# Patient Record
Sex: FEMALE | Race: WHITE | Employment: PART TIME | URBAN - METROPOLITAN AREA
[De-identification: names, ages, dates, MRNs, and addresses within clinical notes are randomized per-mention and may not be internally consistent; named-entity substitution may affect disease eponyms.]

---

## 2017-04-26 ENCOUNTER — HOSPITAL ENCOUNTER (OUTPATIENT)
Dept: MAMMOGRAPHY | Age: 47
Discharge: HOME | End: 2017-04-26
Attending: FAMILY MEDICINE
Payer: COMMERCIAL

## 2017-04-26 DIAGNOSIS — Z00.00 ROUTINE GENERAL MEDICAL EXAMINATION AT A HEALTH CARE FACILITY: ICD-10-CM

## 2017-04-26 PROCEDURE — 77067 SCR MAMMO BI INCL CAD: CPT

## 2018-05-08 PROBLEM — E66.01 SEVERE OBESITY (BMI 35.0-39.9) WITH COMORBIDITY (HCC): Status: ACTIVE | Noted: 2018-05-08

## 2018-06-19 ENCOUNTER — HOSPITAL ENCOUNTER (OUTPATIENT)
Dept: MAMMOGRAPHY | Age: 48
Discharge: HOME | End: 2018-06-19
Attending: OBSTETRICS & GYNECOLOGY
Payer: COMMERCIAL

## 2018-06-19 DIAGNOSIS — Z12.39 SCREENING BREAST EXAMINATION: ICD-10-CM

## 2018-06-19 PROCEDURE — 77067 SCR MAMMO BI INCL CAD: CPT

## 2019-05-06 ENCOUNTER — HOSPITAL ENCOUNTER (OUTPATIENT)
Dept: PHYSICAL THERAPY | Age: 49
Discharge: HOME | End: 2019-05-06
Attending: FAMILY MEDICINE
Payer: COMMERCIAL

## 2019-05-06 NOTE — PROGRESS NOTES
Carla Esquivel  : 1970  Primary: [facility]  Secondary:  [address]  at [facility]  [address] , [address], [place] [address]  [phone]   [phone]        OUTPATIENT DAILY NOTE    NAME/AGE/GENDER: Carla Esquivel is a 57 y.o. female. DATE: 2019    Patient cancelled her appointment today. Will plan to follow up on next scheduled visit.     Clearance Mateus, PT, DPT

## 2019-05-14 ENCOUNTER — HOSPITAL ENCOUNTER (OUTPATIENT)
Dept: PHYSICAL THERAPY | Age: 49
Discharge: HOME | End: 2019-05-14
Attending: FAMILY MEDICINE
Payer: COMMERCIAL

## 2019-05-14 DIAGNOSIS — M54.2 NECK PAIN: ICD-10-CM

## 2019-05-14 DIAGNOSIS — M62.838 NECK MUSCLE SPASM: ICD-10-CM

## 2019-05-14 PROCEDURE — 97012 MECHANICAL TRACTION THERAPY: CPT | Performed by: PHYSICAL THERAPIST

## 2019-05-14 PROCEDURE — 97161 PT EVAL LOW COMPLEX 20 MIN: CPT | Performed by: PHYSICAL THERAPIST

## 2019-05-14 PROCEDURE — 97110 THERAPEUTIC EXERCISES: CPT | Performed by: PHYSICAL THERAPIST

## 2019-05-14 NOTE — PROGRESS NOTES
Norma Mooney  : 1970  Primary: [facility]  Secondary:  [address] at [address] , [address], [place] [address]  [phone]   [phone]      ICD-10: Treatment Diagnosis: (M54.2) Cervicalgia; (M62.81) muscle weakness  Precautions/Allergies:   Buckwheat; Cottonseed oil; and Tree nuts ; Latex  MD Orders: Evaluate and Treat for neck pain MEDICAL/REFERRING DIAGNOSIS:  Neck pain [M54.2]  Neck muscle spasm [M62.838]   DATE OF ONSET:   REFERRING PHYSICIAN: Ángela Magana MD  RETURN PHYSICIAN APPOINTMENT:      OUTPATIENT PHYSICAL THERAPY: Daily Treatment Note 2019  Pre-treatment Symptoms/Complaints:  Pt c/o central and L side neck pain and tightness, as well as, intermittent radicular symptoms that has progressively worsened over the last 5 years. Pain: Initial: Pain Intensity 1: 8  Pain Location 1: Neck  Post Session:  7/10   Medications Last Reviewed:  2019    Updated Objective Findings:  See evaluation note from today   TREATMENT:   THERAPEUTIC EXERCISE: (25 minutes):  Exercises per grid below to improve mobility and strength. Required moderate visual, verbal and tactile cues to promote proper body alignment, promote proper body posture, promote proper body mechanics and promote proper body breathing techniques. Progressed resistance, range, repetitions and complexity of movement as indicated. MANUAL THERAPY: (0 minutes): Joint mobilization and Soft tissue mobilization was utilized and necessary because of the patient's restricted joint motion, painful spasm, loss of articular motion and restricted motion of soft tissue. MODALITIES: (0 minutes): *  Ultrasound was used today secondary to the patient having tightened structures limiting joint motion that require an increase in extensibility.  Ultrasound was used today to reduce pain, reduce spasm, reduce joint stiffness, increase muscle flexibility, increase tendon flexibility and increase ligament flexibility. MECHANICAL TRACTION: (15 minutes): Traction was used due to the patient's cervical radiculopathy and cervicalgia in order to relieve pain in or originating from his spine. Intermittent Mechanical Traction (IMT) @ 20deg angle; (15#/10#); (60 sec hold/20 sec rest); 50% speed, 2 steps to improve cervical AROM and pain, as well as, decrease radicular symptoms. Date:  5/14/19 Date:   Date:     Activity/Exercise Parameters Parameters Parameters   Cervical retraction 10 x 3sec     Posterior shoulder rolls 10x     Shoulder shrugs w/ depression 10x     Scapular retraction 10 x 3sec     Snow [place] at wall 10 x 3sec     Cervical rotation 10 x B                 Treatment/Session Summary:    · Response to Treatment:  Pt with minimally decreased L UT tightness and improved L cervical AROM rotation after traction. .  · Communication/Consultation:  Pt encouraged to work on correct, erect posture, avoiding forward head and rounded shoulders. · Equipment provided today:  HEP  · Recommendations/Intent for next treatment session: Next visit will focus on advancements to more challenging activities. Assess effectiveness of intermittent mechanical traction.   · Variance to POC: None  Treatment Plan of Care Effective Dates:  5/14/19 to 8/12/19  Total Treatment Billable Duration:  40 minutes (25 min therex; 15 min traction); see initial assessment  PT Patient Time In/Time Out  Time In: 0935  Time Out: 100 [facility], PT    Future Appointments   Date Time Provider Alexandra Sims   5/24/2019  8:45 AM Ethridge Sep, PT [facility]   5/29/2019  8:45 AM Bennett Sep, PT [facility]   6/7/2019  8:45 AM Ethridge Sep, PT [facility]   6/12/2019 10:30 AM Bennett Sep, PT [facility]   6/20/2019  4:00 PM [facility] ROOM 1 ERMBARBARA VAL   6/26/2019 10:30 AM Bennett Sep, PT [facility]   7/3/2019 10:30 AM Ruchi Antonia Lott, PT [facility]

## 2019-05-14 NOTE — THERAPY EVALUATION
Nathaniel Al  : 1970  Primary: [facility]  Secondary:  [address]  at [facility]  [address] , [address], [place] [address]  [phone]   [phone]       OUTPATIENT PHYSICAL THERAPY:Initial Assessment 2019   ICD-10: Treatment Diagnosis: (M54.2) Cervicalgia; (M62.81) muscle weakness  Precautions/Allergies:   Buckwheat; Cottonseed oil; and Tree nuts ; Latex  MD Orders: Evaluate and Treat for neck pain MEDICAL/REFERRING DIAGNOSIS:  Neck pain [M54.2]  Neck muscle spasm [M62.838]   DATE OF ONSET:   REFERRING PHYSICIAN: Nanda Watson MD  RETURN PHYSICIAN APPOINTMENT:      INITIAL ASSESSMENT:  Ms. Michael Erazo presents with s/s consistent with cervicalgia due to poor posture. She c/o pain with palpation centrally at C5-C6, as well as, C5-C6 transverse processes B, L > R. Her radicular symptoms follow a C5-C6 distribution down the L UE. She scored a 20% disability rating on the NDI. She states she's still able to work and perform fairly normal job duties. Pt will benefit from skilled PT to address the deficits listed below. PROBLEM LIST (Impacting functional limitations):  1. Decreased Strength  2. Increased Pain  3. Decreased Flexibility/Joint Mobility  4. Decreased Knowledge of Precautions  5. Decreased [place] with Home Exercise Program  6. Decreased Posture INTERVENTIONS PLANNED: (Treatment may consist of any combination of the following)  1. Cold  2. Electrical Stimulation  3. Heat  4. Home Exercise Program (HEP)  5. Manual Therapy  6. Neuromuscular Re-education/Strengthening  7. Range of Motion (ROM)  8. Therapeutic Exercise/Strengthening  9. Transcutaneous Electrical Nerve Stimulation (TENS)  10. Ultrasound (US)  11. Mechanical Traction   12. Dry Needling  13. Kinesiotaping   TREATMENT PLAN:  Effective Dates: 2019 TO 2019 (90 days).   Frequency/Duration: 1-2 times a week for 90 Day(s)  GOALS: (Goals have been discussed and agreed upon with patient.)  Short-Term Functional Goals: Time Frame: 6 weeks (6-25-19)  Pt will be compliant and independent with HEP and demonstrating correct, erect posture without an increase in pain. Pt will improve NDI score to <9/50 to increase pt's overall functional mobility. Pt will improve L shoulder flex to full range with manageable pain (< 5/10) from 90deg and greater increasing pt's ease with overhead activities. Pt to subjectively report a decrease in pain to 5/10 @ worst to increase pt's ease with working, driving and reading. Pt will improve Cervical AROM to > Flex and L rotation to 75% of normal motion with pain <5/10 @ worst.   Discharge Goals: Time Frame: 8-12 weeks (8-12-19)  Pt will improve NDI score to <7/50 to increase pt's overall functional mobility. Pt will improve B shoulder flex/scapt/ABD AROM to WNL B with minimal pain with OH activity. Pt will subjectively report a decrease in pain to 2-3/10 @ worst to allow pt greater ease with reaching overhead. Pt will subjectively report a decrease in frequency and severity of pain with normal work duties. Pt will be discharged from PT to [facility]. OUTCOME MEASURE:   Tool Used: Neck Disability Index (NDI)  Score:  Initial: 10/50 =20% disability Most Recent: X/50 (Date: -- )   Interpretation of Score: The Neck Disability Index is a revised form of the Oswestry Low Back Pain Index and is designed to measure the activities of daily living in person's with neck pain. Each section is scored on a 0-5 scale, 5 representing the greatest disability. The scores of each section are added together for a total score of 50. MEDICAL NECESSITY:   · Patient is expected to demonstrate progress in strength and range of motion to improve pt's posture while decreasing neck and L UE radicular symptoms to increase pt's overall functional mobility.  .  Total Duration: 15 minutes initial assessment; see daily treatment note  PT Patient Time In/Time Out  Time In: 09  Time Out: 1030    Rehabilitation Potential For Stated Goals: Good  Regarding Yane Spencer therapy, I certify that the treatment plan above will be carried out by a therapist or under their direction. Thank you for this referral,  Elsa Hernández, PT     Referring Physician Signature: Mariana Sanabria MD _______________________________ Date _____________     PAIN/SUBJECTIVE:   Initial: Pain Intensity 1: 8  Pain Location 1: Neck  Post Session:  7/10   HISTORY:   History of Injury/Illness (Reason for Referral):  Pt reports she's had neck pain off and on x 5 years with no known cause. She states she now has some radicular symptoms that run down her L UE to her elbow and around her scapular region. She states she takes Tylenol prn and uses lidocaine patches prn for pain management. She reports she would like to improve her pain with daily house hold and work activities. Past Medical History/Comorbidities:   Ms. Mira Pozo  has a past medical history of B12 deficiency (3/9/2015), Esophagitis determined by endoscopy (2016), Gastrointestinal disorder, GERD (gastroesophageal reflux disease) (2015), Heart palpitations, Hyperlipidemia LDL goal < 100 (3/9/2015), Hypertension, Palpitations (2015), Passive smoker (2015), Psychiatric disorder, and Vitamin D deficiency (3/9/2015). Ms. Mira Pozo  has a past surgical history that includes hx cholecystectomy; hx tubal ligation; and hx  section. Social History/Living Environment:     Pt states she lives in a 2 story home with her family with a tub/shower combo. 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 She states she has some difficulty performing overhead activities, but she's able to perform her normal job.    Dominant Side:         RIGHT   Ambulatory/Rehab Services H2 Model Falls Risk Assessment (19)   Risk Factors: No Risk Factors Identified Ability to Rise from Chair:       (0)  Ability to rise in a single movement   Falls Prevention Plan:       No modifications necessary   Total: (5 or greater = High Risk): 0   ©2010 [facility] of [facility] Patent #[id number]. Federal Law prohibits the replication, distribution or use without written permission from [facility] of [employer] [place]   Current Medications:       Current Outpatient Medications:     ergocalciferol (ERGOCALCIFEROL) 50,000 unit capsule, Take 1 Cap by mouth every seven (7) days. , Disp: 12 Cap, Rfl: 1    atorvastatin (LIPITOR) 10 mg tablet, Take 1 Tab by mouth daily. Indications: high cholesterol, treatment to prevent a heart attack, Disp: 90 Tab, Rfl: 3    ALPRAZolam (XANAX) 0.5 mg tablet, Take 1 Tab by mouth daily as needed for Anxiety. Indications: panic disorder, Disp: 90 Tab, Rfl: 1    buPROPion (WELLBUTRIN) 75 mg tablet, Take 1 Tab by mouth daily (with breakfast). Indications: Anxiety, Disp: 90 Tab, Rfl: 3    tiZANidine (ZANAFLEX) 2 mg tablet, Take 1 Tab by mouth nightly. Indications: muscle spasm, Disp: 90 Tab, Rfl: 0    fluticasone (FLONASE) 50 mcg/actuation nasal spray, 2 Sprays by Both Nostrils route daily. , Disp: 3 Bottle, Rfl: 5    montelukast (SINGULAIR) 10 mg tablet, Take 1 Tab by mouth every morning. For allergies, Disp: 90 Tab, Rfl: 3   Date Last Reviewed:  5/14/2019     Number of Personal Factors/Comorbidities that affect the Plan of Care: 0: LOW COMPLEXITY   EXAMINATION:   Observation/Orthostatic Postural Assessment:          Pt sits with moderately forward head and rounded shoulders. She is very large busted. Palpation:          Pt moderately to severely tender with palpation to central C spine, C5, C6 and C7, as well as, L side transverse processes. Increased B cervical spine and UT tightness, L > R. Trigger points: Pt with positive trigger points in L anterior and posterior sharp of L UT.    Flexibility: Tightness in B pec major/pec minor, as well as, anterior scalenes and B UT/levator scap, L > R. Posture/Deformity: none noted  Cervical AROM: % of normal motion in standing  Cervical spine Date:  5/14/19 Date:   Date:     Direction  Parameters Parameters Parameters   Flexion  50% w/ pain L     Extension  50% no pain     Rotation  R: 75% w/ tightness L  L:50% w/ pain L      Side bending  R: 50% w/ tightness B  L:50% w/ tightness B     Shoulder Flex R:WFL; no pain  L:WFL; pain > 90deg     Shoulder ABD R:WFL; no pain  L:WFL; no pain     Shoulder ER/IR R:WFL B, each  L:WFL B, each         Strength Date:  5/14/19 Date:   Date:     Cervical/Shoulder Parameters Parameters Parameters   Cervical 3+/5 throughout     Shoulder Flex/Scapt 3+/5 B     Shoulder ABD 3+/5 B     Shoulder ER 3+/5 B     Shoulder IR 3+/5 B     Elbow Flex 4/5 B     Elbow Ext 3/5 B       Myotomes: Normal and equal B throughout  Diaphragm (C4):  Deltoid/Biceps (C5):  Wrist Extensors (C6):  Triceps (C7):  Flexor Profundus (C8):    Sensation: Normal and equal B throughout  Biceps (C5):   Griffin Radial (C6-C7):  Griffin Ulner (C8-T1):    Special Test/Function:  Compression: + pain B C-spine  Distraction: no change  Spurling's maneuver:+ pain/radicular symptoms L  Accessory mobility: restricted throughout     Body Structures Involved:  1. Nerves  2. Bones  3. Joints  4. Muscles  5. Ligaments Body Functions Affected:  1. Sensory/Pain  2. Neuromusculoskeletal  3. Movement Related Activities and Participation Affected:  1. Mobility  2. Self Care  3. Domestic Life  4. Interpersonal Interactions and Relationships  5. Community, Social and [place]  6.  Work   Number of elements (examined above) that affect the Plan of Care: 1-2: LOW COMPLEXITY   CLINICAL PRESENTATION:   Presentation: Stable and uncomplicated: LOW COMPLEXITY   CLINICAL DECISION MAKING:   Use of outcome tool(s) and clinical judgement create a POC that gives a: Clear prediction of patient's progress: LOW COMPLEXITY

## 2019-05-24 ENCOUNTER — HOSPITAL ENCOUNTER (OUTPATIENT)
Dept: PHYSICAL THERAPY | Age: 49
Discharge: HOME | End: 2019-05-24
Attending: FAMILY MEDICINE
Payer: COMMERCIAL

## 2019-05-24 PROCEDURE — 97012 MECHANICAL TRACTION THERAPY: CPT | Performed by: PHYSICAL THERAPIST

## 2019-05-24 PROCEDURE — 97035 APP MDLTY 1+ULTRASOUND EA 15: CPT | Performed by: PHYSICAL THERAPIST

## 2019-05-24 PROCEDURE — 97140 MANUAL THERAPY 1/> REGIONS: CPT | Performed by: PHYSICAL THERAPIST

## 2019-05-24 NOTE — PROGRESS NOTES
Yordan Silvia  : 1970  Primary: [facility]  Secondary:  [address]  at [facility]  [address] , [address], [place] [address]  [phone]   [phone]      ICD-10: Treatment Diagnosis: (M54.2) Cervicalgia; (M62.81) muscle weakness  Precautions/Allergies:   Buckwheat; Cottonseed oil; and Tree nuts ; Latex  MD Orders: Evaluate and Treat for neck pain MEDICAL/REFERRING DIAGNOSIS:  Neck pain [M54.2]  Neck muscle spasm [M62.838]   DATE OF ONSET:   REFERRING PHYSICIAN: Lieutenant José Miguel MD  RETURN PHYSICIAN APPOINTMENT:      OUTPATIENT PHYSICAL THERAPY: Daily Treatment Note 2019  Pre-treatment Symptoms/Complaints:  Pt reports increased arm soreness after initial evaluation, however, reports improved neck pain. She reports compliance with HEP. Pain: Initial: Pain Intensity 1: 7  Pain Location 1: Neck  Post Session:  5/10; improved motion B   Medications Last Reviewed:  2019    Updated Objective Findings:  None Today   TREATMENT:   THERAPEUTIC EXERCISE: (0 minutes):  Exercises per grid below to improve mobility and strength. Required moderate visual, verbal and tactile cues to promote proper body alignment, promote proper body posture, promote proper body mechanics and promote proper body breathing techniques. Progressed resistance, range, repetitions and complexity of movement as indicated. MANUAL THERAPY: (15 minutes): Joint mobilization and Soft tissue mobilization was utilized and necessary because of the patient's restricted joint motion, painful spasm, loss of articular motion and restricted motion of soft tissue. MODALITIES: (10 minutes): *  Ultrasound was used today secondary to the patient having tightened structures limiting joint motion that require an increase in extensibility.  Ultrasound was used today to reduce pain, reduce spasm, reduce joint stiffness, increase muscle flexibility, increase tendon flexibility and increase ligament flexibility. MECHANICAL TRACTION: (15 minutes): Traction was used due to the patient's cervical radiculopathy and cervicalgia in order to relieve pain in or originating from his spine. Intermittent Mechanical Traction (IMT) @ 25deg angle; (15#/10#); (60 sec hold/20 sec rest); 50% speed, 2 steps to improve cervical AROM and pain, as well as, decrease radicular symptoms. Kinesiotape to inhibit B UT to aide with decreasing pain and improving tissue mobility. Date:  5/14/19 Date:   Date:     Activity/Exercise Parameters Parameters Parameters   Cervical retraction 10 x 3sec     Posterior shoulder rolls 10x     Shoulder shrugs w/ depression 10x     Scapular retraction 10 x 3sec     Snow [place] at wall 10 x 3sec     Cervical rotation 10 x B                 Treatment/Session Summary:    · Response to Treatment:  Pt with increased tightness and tissue density in B cervical paraspinals, B UT and B levator scap that responded well to US, STM/MFR and tape. She demonstrated improved cervical rotation AROM after IMT. Pt is progreassing nicely toward current goals. .  · Communication/Consultation:  Pt encouraged to work on correct, erect posture, avoiding forward head and rounded shoulders. Pt encouraged to drink water after manual therapy. · Equipment provided today:  Kinesiotape  · Recommendations/Intent for next treatment session: Next visit will focus on advancements to more challenging activities. Assess effectiveness of intermittent mechanical traction, US, manual therapy and kinesiotape.   · Variance to POC: None  Treatment Plan of Care Effective Dates:  5/14/19 to 8/12/19  Total Treatment Billable Duration:  40 minutes (15 min traction; 10 min US; 15 min manual)  PT Patient Time In/Time Out  Time In: 3390  Time Out: 0930  Peggy Gonzáles PT    Future Appointments   Date Time Provider Alexandra Sims   6/4/2019  1:45 PM Michael Ramirez PT [facility]   6/7/2019  8:45 AM [facility]   6/12/2019 10:30 AM Gopi Ling, PT [facility]   6/20/2019  4:00 PM [facility] ROOM 1 SHRUTHI BROTHERS   6/26/2019 10:30 AM [facility] Ling[facility]   7/3/2019 10:30 AM Carden, Janella Pallas, PT [facility]

## 2019-05-29 ENCOUNTER — APPOINTMENT (OUTPATIENT)
Dept: PHYSICAL THERAPY | Age: 49
End: 2019-05-29
Attending: FAMILY MEDICINE
Payer: COMMERCIAL

## 2019-06-04 ENCOUNTER — HOSPITAL ENCOUNTER (OUTPATIENT)
Dept: PHYSICAL THERAPY | Age: 49
Discharge: HOME | End: 2019-06-04
Attending: FAMILY MEDICINE
Payer: COMMERCIAL

## 2019-06-04 PROCEDURE — 97012 MECHANICAL TRACTION THERAPY: CPT | Performed by: PHYSICAL THERAPIST

## 2019-06-04 PROCEDURE — 97035 APP MDLTY 1+ULTRASOUND EA 15: CPT | Performed by: PHYSICAL THERAPIST

## 2019-06-04 PROCEDURE — 97140 MANUAL THERAPY 1/> REGIONS: CPT | Performed by: PHYSICAL THERAPIST

## 2019-06-04 NOTE — PROGRESS NOTES
Fagan Nathan  : 1970  Primary: [facility]  Secondary:  [address]  at [facility]  [address] , [address], [place] [address]  [phone]   [phone]      ICD-10: Treatment Diagnosis: (M54.2) Cervicalgia; (M62.81) muscle weakness  Precautions/Allergies:   Buckwheat; Cottonseed oil; and Tree nuts ; Latex  MD Orders: Evaluate and Treat for neck pain MEDICAL/REFERRING DIAGNOSIS:  Neck pain [M54.2]  Neck muscle spasm [M62.838]   DATE OF ONSET:   REFERRING PHYSICIAN: Deuce Larios MD  RETURN PHYSICIAN APPOINTMENT:      OUTPATIENT PHYSICAL THERAPY: Daily Treatment Note 2019  Pre-treatment Symptoms/Complaints:  Pt reports improved neck and L arm pain after last PT visit. She states she did well with the tape and it lasted 3-4 days. Pain: Initial: Pain Intensity 1: 5  Pain Location 1: Neck  Post Session:  3/10; improved motion B   Medications Last Reviewed:  2019    Updated Objective Findings:  None Today   TREATMENT:   THERAPEUTIC EXERCISE: (0 minutes):  Exercises per grid below to improve mobility and strength. Required moderate visual, verbal and tactile cues to promote proper body alignment, promote proper body posture, promote proper body mechanics and promote proper body breathing techniques. Progressed resistance, range, repetitions and complexity of movement as indicated. MANUAL THERAPY: (15 minutes): Joint mobilization and Soft tissue mobilization was utilized and necessary because of the patient's restricted joint motion, painful spasm, loss of articular motion and restricted motion of soft tissue. MODALITIES: (10 minutes): *  Ultrasound was used today secondary to the patient having tightened structures limiting joint motion that require an increase in extensibility.  Ultrasound was used today to reduce pain, reduce spasm, reduce joint stiffness, increase muscle flexibility, increase tendon flexibility and increase ligament flexibility. MECHANICAL TRACTION: (15 minutes): Traction was used due to the patient's cervical radiculopathy and cervicalgia in order to relieve pain in or originating from his spine. Intermittent Mechanical Traction (IMT) @ 20deg angle; (19#/10#); (60 sec hold/20 sec rest); 50% speed, 2 steps to improve cervical AROM and pain, as well as, decrease radicular symptoms. Kinesiotape to inhibit B UT to aide with decreasing pain and improving tissue mobility. Date:  5/14/19 Date:   Date:     Activity/Exercise Parameters Parameters Parameters   Cervical retraction 10 x 3sec     Posterior shoulder rolls 10x     Shoulder shrugs w/ depression 10x     Scapular retraction 10 x 3sec     Snow [place] at wall 10 x 3sec     Cervical rotation 10 x B                 Treatment/Session Summary:    · Response to Treatment:  B UT tightness and tissue density continues. She was able to tolerate trigger point release in B UT. Her cervical flexion and rotation AROM improved after IMT. Pt is progressing toward current goals. .  · Communication/Consultation:  Pt encouraged to work on correct, erect posture, avoiding forward head and rounded shoulders. Pt encouraged to drink water after manual therapy. · Equipment provided today:  Kinesiotape  · Recommendations/Intent for next treatment session: Next visit will focus on manual therapy, modalities and traction as long as it is providing benefits. Advance postural stretches and strengthening as pt is able.   · Variance to POC: None  Treatment Plan of Care Effective Dates:  5/14/19 to 8/12/19  Total Treatment Billable Duration:  40 minutes (15 min traction; 10 min US; 15 min manual)  PT Patient Time In/Time Out  Time In: 1345  Time Out: 1435  Luke Meter, PT    Future Appointments   Date Time Provider Alexandra Sims   6/7/2019  8:45 AM JADE Farmer   6/12/2019 10:30 AM Veronica Childs PT [facility]   6/20/2019  4:00 PM DENEEN WILEY  ROOM 1 SHRUTHI [facility]   6/26/2019 10:30 AM JADE [facility]   7/3/2019 10:30 AM Amaya Hopper PT [facility]

## 2019-06-12 ENCOUNTER — HOSPITAL ENCOUNTER (OUTPATIENT)
Dept: PHYSICAL THERAPY | Age: 49
Discharge: HOME | End: 2019-06-12
Attending: FAMILY MEDICINE
Payer: COMMERCIAL

## 2019-06-12 PROCEDURE — 97012 MECHANICAL TRACTION THERAPY: CPT | Performed by: PHYSICAL THERAPIST

## 2019-06-12 PROCEDURE — 97035 APP MDLTY 1+ULTRASOUND EA 15: CPT | Performed by: PHYSICAL THERAPIST

## 2019-06-12 PROCEDURE — 97140 MANUAL THERAPY 1/> REGIONS: CPT | Performed by: PHYSICAL THERAPIST

## 2019-06-12 NOTE — PROGRESS NOTES
Haskel Brunner  : 1970  Primary: [facility]  Secondary:  [address]  at [facility]  [address] , [address], [place]  [phone]   [phone]      ICD-10: Treatment Diagnosis: (M54.2) Cervicalgia; (M62.81) muscle weakness  Precautions/Allergies:   Buckwheat; Cottonseed oil; and Tree nuts ; Latex  MD Orders: Evaluate and Treat for neck pain MEDICAL/REFERRING DIAGNOSIS:  Neck pain [M54.2]  Neck muscle spasm [M62.838]   DATE OF ONSET:   REFERRING PHYSICIAN: Kavitha Haro MD  RETURN PHYSICIAN APPOINTMENT:      OUTPATIENT PHYSICAL THERAPY: Daily Treatment Note 2019  Pre-treatment Symptoms/Complaints: Pt reports she's had more superior shoulder tension/tigthness over the last week. Continued improvement in radicular symptoms. Pain: Initial: Pain Intensity 1: 4  Pain Location 1: Neck  Post Session:  2-3/10; improved motion B   Medications Last Reviewed:  2019    Updated Objective Findings:  None Today   TREATMENT:   THERAPEUTIC EXERCISE: (0 minutes):  Exercises per grid below to improve mobility and strength. Required moderate visual, verbal and tactile cues to promote proper body alignment, promote proper body posture, promote proper body mechanics and promote proper body breathing techniques. Progressed resistance, range, repetitions and complexity of movement as indicated. MANUAL THERAPY: (15 minutes): Joint mobilization and Soft tissue mobilization was utilized and necessary because of the patient's restricted joint motion, painful spasm, loss of articular motion and restricted motion of soft tissue. MODALITIES: (10 minutes): *  Ultrasound @ 1 MHz, 100%, 1.0w/cm2  was used today secondary to the patient having tightened structures limiting joint motion that require an increase in extensibility.  Ultrasound was used today to reduce pain, reduce spasm, reduce joint stiffness, increase muscle flexibility, increase tendon flexibility and increase ligament flexibility. MECHANICAL TRACTION: (15 minutes): Traction was used due to the patient's cervical radiculopathy and cervicalgia in order to relieve pain in or originating from his spine. Intermittent Mechanical Traction (IMT) @ 20deg angle; (19#/10#); (60 sec hold/20 sec rest); 50% speed, 2 steps to improve cervical AROM and pain, as well as, decrease radicular symptoms. Kinesiotape to inhibit B UT to aide with decreasing pain and improving tissue mobility. Date:  5/14/19 Date:   Date:     Activity/Exercise Parameters Parameters Parameters   Cervical retraction 10 x 3sec     Posterior shoulder rolls 10x     Shoulder shrugs w/ depression 10x     Scapular retraction 10 x 3sec     Snow [place] at wall 10 x 3sec     Cervical rotation 10 x B                 Treatment/Session Summary:    · Response to Treatment:  B UT tightness and tissue density is improving. She did well with manual therapy today. Trigger point release not needed. Improved cervical flexion and B rotation AROM improved after intermittent mechanical traction. Pt is progressing toward current goals. .  · Communication/Consultation:  Pt encouraged to work on correct, erect posture, avoiding forward head and rounded shoulders. Pt encouraged to drink water after manual therapy. · Equipment provided today:  Kinesiotape  · Recommendations/Intent for next treatment session: Next visit will focus on manual therapy, modalities and traction as long as it is providing benefits. Advance postural stretches and strengthening as pt is able.   · Variance to POC: None  Treatment Plan of Care Effective Dates:  5/14/19 to 8/12/19  Total Treatment Billable Duration:  40 minutes (15 min traction; 10 min US; 15 min manual)  PT Patient Time In/Time Out  Time In: 1040  Time Out: [id number] [facility],     Future Appointments   Date Time Provider Alexandra Sims   6/20/2019  4:00 PM [facility] ROOM [facility] [place] 6/26/2019 10:30 AM Michael Ramirez, JADE W[facility]EN[facility]   7/3/2019 10:30 AM Amaya Castillo, JADE WADE [facility]ILLENNIUM

## 2019-06-20 ENCOUNTER — HOSPITAL ENCOUNTER (OUTPATIENT)
Dept: MAMMOGRAPHY | Age: 49
Discharge: HOME | End: 2019-06-20
Attending: FAMILY MEDICINE
Payer: COMMERCIAL

## 2019-06-20 DIAGNOSIS — Z12.39 BREAST CANCER SCREENING: ICD-10-CM

## 2019-06-20 PROCEDURE — 77067 SCR MAMMO BI INCL CAD: CPT

## 2019-06-21 NOTE — PROGRESS NOTES
Dear Christopher Hanna     Your recent mammogram showed :No mammographic evidence of malignancy.      Recommend annual mammogram in one year.  A reminder letter will be scheduled.     Thanks,  Dr. Cristino Gibson

## 2019-06-26 ENCOUNTER — HOSPITAL ENCOUNTER (OUTPATIENT)
Dept: PHYSICAL THERAPY | Age: 49
Discharge: HOME | End: 2019-06-26
Attending: FAMILY MEDICINE
Payer: COMMERCIAL

## 2019-06-26 PROCEDURE — 97035 APP MDLTY 1+ULTRASOUND EA 15: CPT | Performed by: PHYSICAL THERAPIST

## 2019-06-26 PROCEDURE — 97012 MECHANICAL TRACTION THERAPY: CPT | Performed by: PHYSICAL THERAPIST

## 2019-06-26 PROCEDURE — 97140 MANUAL THERAPY 1/> REGIONS: CPT | Performed by: PHYSICAL THERAPIST

## 2019-06-26 NOTE — PROGRESS NOTES
Hunter Jeffrey  : 1970  Primary: [facility]  Secondary:  [address]  at [facility]  [address] , [address], [place] [address]  [phone]   [phone]      ICD-10: Treatment Diagnosis: (M54.2) Cervicalgia; (M62.81) muscle weakness  Precautions/Allergies:   Buckwheat; Cottonseed oil; and Tree nuts ; Latex  MD Orders: Evaluate and Treat for neck pain MEDICAL/REFERRING DIAGNOSIS:  Neck pain [M54.2]  Neck muscle spasm [M62.838]   DATE OF ONSET:   REFERRING PHYSICIAN: Whit Heath MD  RETURN PHYSICIAN APPOINTMENT:      OUTPATIENT PHYSICAL THERAPY: Daily Treatment Note 2019  Pre-treatment Symptoms/Complaints: Pt reports increased cervical and L UE pain after last PT visit, but then her pan subsided and she's had only minimal over the last week. Pain: Initial: Pain Intensity 1: 3  Pain Location 1: Neck  Post Session:  2/10; improved motion B   Medications Last Reviewed:  2019    Updated Objective Findings:  None Today   TREATMENT:   THERAPEUTIC EXERCISE: (0 minutes):  Exercises per grid below to improve mobility and strength. Required moderate visual, verbal and tactile cues to promote proper body alignment, promote proper body posture, promote proper body mechanics and promote proper body breathing techniques. Progressed resistance, range, repetitions and complexity of movement as indicated. MANUAL THERAPY: (15 minutes): Joint mobilization and Soft tissue mobilization was utilized and necessary because of the patient's restricted joint motion, painful spasm, loss of articular motion and restricted motion of soft tissue. MODALITIES: (10 minutes): *  Ultrasound @ 1 MHz, 100%, 1.0w/cm2  was used today secondary to the patient having tightened structures limiting joint motion that require an increase in extensibility.  Ultrasound was used today to reduce pain, reduce spasm, reduce joint stiffness, increase muscle flexibility, increase tendon flexibility and increase ligament flexibility. MECHANICAL TRACTION: (15 minutes): Traction was used due to the patient's cervical radiculopathy and cervicalgia in order to relieve pain in or originating from his spine. Intermittent Mechanical Traction (IMT) @ 20deg angle; (15#/10#); (60 sec hold/20 sec rest); 50% speed, 2 steps to improve cervical AROM and pain, as well as, decrease radicular symptoms. Kinesiotape to inhibit B UT to aide with decreasing pain and improving tissue mobility. Date:  5/14/19 Date:   Date:     Activity/Exercise Parameters Parameters Parameters   Cervical retraction 10 x 3sec     Posterior shoulder rolls 10x     Shoulder shrugs w/ depression 10x     Scapular retraction 10 x 3sec     [place] at wall 10 x 3sec     Cervical rotation 10 x B                 Treatment/Session Summary:    · Response to Treatment:  B UT tightness and tissue density is improving. She did well with manual therapy today. Trigger point release not needed. Improved cervical flexion and B rotation AROM improved after intermittent mechanical traction. Pt is progressing toward current goals. Pt continues to respond well to above treatment. Pt is progressing toward current goals. · Communication/Consultation:  Pt encouraged to work on correct, erect posture, avoiding forward head and rounded shoulders. Pt encouraged to drink water after manual therapy. · Equipment provided today:  Kinesiotape  · Recommendations/Intent for next treatment session: Next visit will focus on manual therapy, modalities and traction as long as it is providing benefits. Advance postural stretches and strengthening as pt is able.   · Variance to POC: None  Treatment Plan of Care Effective Dates:  5/14/19 to 8/12/19  Total Treatment Billable Duration:  40 minutes (15 min traction; 10 min US; 15 min manual)  PT Patient Time In/Time Out  Time In: 1035  Time Out: 1735 Stefanie Sexton, PT    Future Appointments   Date Time Provider Alexandra Sims   7/3/2019 10:30 AM [facility]

## 2019-07-05 ENCOUNTER — HOSPITAL ENCOUNTER (OUTPATIENT)
Dept: PHYSICAL THERAPY | Age: 49
Discharge: HOME | End: 2019-07-05
Attending: FAMILY MEDICINE
Payer: COMMERCIAL

## 2019-07-05 PROCEDURE — 97035 APP MDLTY 1+ULTRASOUND EA 15: CPT | Performed by: PHYSICAL THERAPIST

## 2019-07-05 PROCEDURE — 97140 MANUAL THERAPY 1/> REGIONS: CPT | Performed by: PHYSICAL THERAPIST

## 2019-07-05 PROCEDURE — 97012 MECHANICAL TRACTION THERAPY: CPT | Performed by: PHYSICAL THERAPIST

## 2019-07-05 NOTE — PROGRESS NOTES
Eugenia   : 1970  Primary: [facility]  Secondary:  [address] at [facility]  [address] , [address], [place] [address]  [phone]   [phone]      ICD-10: Treatment Diagnosis: (M54.2) Cervicalgia; (M62.81) muscle weakness  Precautions/Allergies:   Buckwheat; Cottonseed oil; and Tree nuts ; Latex  MD Orders: Evaluate and Treat for neck pain MEDICAL/REFERRING DIAGNOSIS:  Neck pain [M54.2]  Neck muscle spasm [M62.838]   DATE OF ONSET:   REFERRING PHYSICIAN: Kaitlin Hernandez MD  RETURN PHYSICIAN APPOINTMENT:      OUTPATIENT PHYSICAL THERAPY: Daily Treatment Note 2019  Pre-treatment Symptoms/Complaints: Pt reports neck pain and tightness is improving and feels better for several days after her PT appointment. Pt reports it is difficult to get here more than 1x/week due to having children and working. Pain: Initial: Pain Intensity 1: 3  Pain Location 1: Neck  Post Session:  1-2/10; improved motion B   Medications Last Reviewed:  2019    Updated Objective Findings:  None Today   TREATMENT:   THERAPEUTIC EXERCISE: (0 minutes):  Exercises per grid below to improve mobility and strength. Required moderate visual, verbal and tactile cues to promote proper body alignment, promote proper body posture, promote proper body mechanics and promote proper body breathing techniques. Progressed resistance, range, repetitions and complexity of movement as indicated. MANUAL THERAPY: (15 minutes): Joint mobilization and Soft tissue mobilization was utilized and necessary because of the patient's restricted joint motion, painful spasm, loss of articular motion and restricted motion of soft tissue. MODALITIES: (10 minutes): *  Ultrasound @ 1 MHz, 100%, 1.0w/cm2  was used today secondary to the patient having tightened structures limiting joint motion that require an increase in extensibility.  Ultrasound was used today to reduce pain, reduce spasm, reduce joint stiffness, increase muscle flexibility, increase tendon flexibility and increase ligament flexibility. MECHANICAL TRACTION: (15 minutes): Traction was used due to the patient's cervical radiculopathy and cervicalgia in order to relieve pain in or originating from his spine. Intermittent Mechanical Traction (IMT) @ 20deg angle; (17#/10#); (60 sec hold/20 sec rest); 50% speed, 2 steps to improve cervical AROM and pain, as well as, decrease radicular symptoms. Kinesiotape to inhibit B UT to aide with decreasing pain and improving tissue mobility. Date:  5/14/19 Date:   Date:     Activity/Exercise Parameters Parameters Parameters   Cervical retraction 10 x 3sec     Posterior shoulder rolls 10x     Shoulder shrugs w/ depression 10x     Scapular retraction 10 x 3sec     [place] at wall 10 x 3sec     Cervical rotation 10 x B                 Treatment/Session Summary:    · Response to Treatment:  B UT tigthness continues, but is improving. She reports less frequency of L UE pain and radicular symptoms. She was able to tolerate increased poundage pull with traction today with no aderse effects. Pt continues to repond well to above treatment. Pt is slowly progressing toward current goals. · Communication/Consultation:  Pt encouraged to work on correct, erect posture, avoiding forward head and rounded shoulders. Pt encouraged to drink water after manual therapy. · Equipment provided today:  None today  · Recommendations/Intent for next treatment session: Next visit will focus on manual therapy, modalities and traction as long as it is providing benefits. Advance postural stretches and strengthening as pt is able. Re-assess over the next couple of weeks.    · Variance to POC: None  Treatment Plan of Care Effective Dates:  5/14/19 to 8/12/19  Total Treatment Billable Duration:  40 minutes (15 min traction; 10 min US; 15 min manual)  PT Patient Time In/Time Out  Time In: 5200  Time Out: [address] [place], [place]    No future appointments.

## 2019-08-05 NOTE — PROGRESS NOTES
I am accessing Ms. Marce Goldberg chart as a part of our department's internal chart auditing process. I certify that Ms. Marty Blackwell is, or was, a patient in our department.   Thank you,  Louise Woo, PT  8/5/2019

## 2019-09-25 NOTE — THERAPY DISCHARGE
Marni Oneill : 1970 Primary: [facility] Secondary:  Therapy Center at [facility] [address], [address], [place] [address] [phone]   [phone] OUTPATIENT PHYSICAL THERAPY:Discontinuation Summary 2019 ICD-10: Treatment Diagnosis: (M54.2) Cervicalgia; (M62.81) muscle weakness Precautions/Allergies:  
Buckwheat; Cottonseed oil; and Tree nuts ; Latex MD Orders: Evaluate and Treat for neck pain MEDICAL/REFERRING DIAGNOSIS: 
Neck pain [M54.2] DATE OF ONSET:  REFERRING PHYSICIAN: Grace Goldstein MD 
RETURN PHYSICIAN APPOINTMENT: [id number] Marni Oneill has been seen in physical therapy from 19 to 19 for 6 visits. Treatment has been discontinued at this time due to Expiration of plan of care without further referral by ordering physician and patient failing to return for additional treatment. The below goals were met prior to discontinuation. Some goals were not met due to chronicity of the problem, as well as, inabililty to assess progress. Pt is DC'd from PT to HEP at this time. Thank you for this referral.    
  
PROBLEM LIST (Impacting functional limitations): 1. Decreased Strength 2. Increased Pain 3. Decreased Flexibility/Joint Mobility 4. Decreased Knowledge of Precautions 5. Decreased [place] with Home Exercise Program 
6. Decreased Posture INTERVENTIONS PLANNED: (Treatment may consist of any combination of the following) 1. Cold 2. Electrical Stimulation 3. Heat 4. Home Exercise Program (HEP) 5. Manual Therapy 6. Neuromuscular Re-education/Strengthening 7. Range of Motion (ROM) 8. Therapeutic Exercise/Strengthening 9. Transcutaneous Electrical Nerve Stimulation (TENS) 10. Ultrasound (US) 11. Mechanical Traction 12. Dry Needling 13. Kinesiotaping TREATMENT PLAN: 
Effective Dates: 2019 TO 2019 (90 days).   Frequency/Duration: 1-2 times a week for 90 Day(s) GOALS: (Goals have been discussed and agreed upon with patient.) Short-Term Functional Goals: Time Frame: 6 weeks (6-25-19) Pt will be compliant and independent with HEP and demonstrating correct, erect posture without an increase in pain.------------------------------MET Pt will improve NDI score to <9/50 to increase pt's overall functional mobility.---------------------------------------------------------NOT ASSESSED Pt will improve L shoulder flex to full range with manageable pain (< 5/10) from 90deg and greater increasing pt's ease with overhead activities. --------------MET Pt to subjectively report a decrease in pain to 5/10 @ worst to increase pt's ease with working, driving and reading. ----------------------------------MET Pt will improve Cervical AROM to > Flex and L rotation to 75% of normal motion with pain <5/10 @ worst. ---------------------------------------------MET Discharge Goals: Time Frame: 8-12 weeks (8-12-19) Pt will improve NDI score to <7/50 to increase pt's overall functional mobility.------------------------------NOT ASSESSED Pt will improve B shoulder flex/scapt/ABD AROM to WNL B with minimal pain with OH activity. -----------------------------MET Pt will subjectively report a decrease in pain to 2-3/10 @ worst to allow pt greater ease with reaching overhead. ------------------MET Pt will subjectively report a decrease in frequency and severity of pain with normal work duties. ----------------------------------MET Pt will be discharged from PT to HEP. ---------------------------------------------------------------MET Guanaco Oliver PT

## 2020-06-28 ENCOUNTER — HOSPITAL ENCOUNTER (OUTPATIENT)
Dept: MAMMOGRAPHY | Age: 50
Discharge: HOME | End: 2020-06-28
Attending: OBSTETRICS & GYNECOLOGY
Payer: COMMERCIAL

## 2020-06-28 DIAGNOSIS — Z12.31 SCREENING MAMMOGRAM, ENCOUNTER FOR: ICD-10-CM

## 2020-06-28 PROCEDURE — 77067 SCR MAMMO BI INCL CAD: CPT

## 2021-05-25 PROBLEM — Z63.79 FAMILY ILLNESS: Status: ACTIVE | Noted: 2021-05-25

## 2021-05-25 PROBLEM — F41.8 ANXIETY WITH DEPRESSION: Status: ACTIVE | Noted: 2021-05-25

## 2021-05-25 PROBLEM — M72.2 PLANTAR FASCIA SYNDROME: Status: ACTIVE | Noted: 2021-05-25

## 2021-06-21 ENCOUNTER — TRANSCRIBE ORDER (OUTPATIENT)
Dept: SCHEDULING | Age: 51
End: 2021-06-21

## 2021-06-21 DIAGNOSIS — Z12.31 SCREENING MAMMOGRAM FOR HIGH-RISK PATIENT: Primary | ICD-10-CM

## 2021-07-01 ENCOUNTER — HOSPITAL ENCOUNTER (OUTPATIENT)
Dept: MAMMOGRAPHY | Age: 51
Discharge: HOME | End: 2021-07-01
Attending: OBSTETRICS & GYNECOLOGY
Payer: COMMERCIAL

## 2021-07-01 DIAGNOSIS — Z12.31 SCREENING MAMMOGRAM FOR HIGH-RISK PATIENT: ICD-10-CM

## 2021-07-01 PROCEDURE — 77067 SCR MAMMO BI INCL CAD: CPT

## 2022-03-18 PROBLEM — E66.01 SEVERE OBESITY (BMI 35.0-39.9) WITH COMORBIDITY (HCC): Status: ACTIVE | Noted: 2018-05-08

## 2022-03-18 PROBLEM — F41.8 ANXIETY WITH DEPRESSION: Status: ACTIVE | Noted: 2021-05-25

## 2022-03-19 PROBLEM — M72.2 PLANTAR FASCIA SYNDROME: Status: ACTIVE | Noted: 2021-05-25

## 2022-03-19 PROBLEM — Z63.79 FAMILY ILLNESS: Status: ACTIVE | Noted: 2021-05-25

## 2022-06-22 ENCOUNTER — HOSPITAL ENCOUNTER (OUTPATIENT)
Dept: MAMMOGRAPHY | Age: 52
Discharge: HOME | End: 2022-06-25
Payer: COMMERCIAL

## 2022-06-22 DIAGNOSIS — Z12.31 OTHER SCREENING MAMMOGRAM: ICD-10-CM

## 2022-06-22 PROCEDURE — 77067 SCR MAMMO BI INCL CAD: CPT

## 2023-05-11 ENCOUNTER — OFFICE VISIT (OUTPATIENT)
Dept: PRIMARY CARE CLINIC | Facility: CLINIC | Age: 53
End: 2023-05-11
Payer: COMMERCIAL

## 2023-05-11 VITALS
HEIGHT: 60 IN | HEART RATE: 62 BPM | RESPIRATION RATE: 15 BRPM | WEIGHT: 165 LBS | DIASTOLIC BLOOD PRESSURE: 89 MMHG | SYSTOLIC BLOOD PRESSURE: 138 MMHG | TEMPERATURE: 97.4 F | BODY MASS INDEX: 32.39 KG/M2 | OXYGEN SATURATION: 97 %

## 2023-05-11 DIAGNOSIS — F41.8 ANXIETY WITH DEPRESSION: ICD-10-CM

## 2023-05-11 DIAGNOSIS — R06.02 SHORTNESS OF BREATH: ICD-10-CM

## 2023-05-11 DIAGNOSIS — Z79.899 MEDICATION MANAGEMENT: ICD-10-CM

## 2023-05-11 DIAGNOSIS — E78.2 MIXED HYPERLIPIDEMIA: ICD-10-CM

## 2023-05-11 DIAGNOSIS — R73.9 ELEVATED BLOOD SUGAR: ICD-10-CM

## 2023-05-11 DIAGNOSIS — Z11.59 ENCOUNTER FOR HEPATITIS C SCREENING TEST FOR LOW RISK PATIENT: ICD-10-CM

## 2023-05-11 DIAGNOSIS — Z12.11 COLON CANCER SCREENING: ICD-10-CM

## 2023-05-11 DIAGNOSIS — Z11.4 ENCOUNTER FOR SCREENING FOR HIV: ICD-10-CM

## 2023-05-11 DIAGNOSIS — Z00.01 ENCOUNTER FOR GENERAL ADULT MEDICAL EXAMINATION WITH ABNORMAL FINDINGS: ICD-10-CM

## 2023-05-11 DIAGNOSIS — Z00.01 ENCOUNTER FOR GENERAL ADULT MEDICAL EXAMINATION WITH ABNORMAL FINDINGS: Primary | ICD-10-CM

## 2023-05-11 DIAGNOSIS — Z91.89 MULTIPLE RISK FACTORS FOR CORONARY ARTERY DISEASE: ICD-10-CM

## 2023-05-11 DIAGNOSIS — Z01.419 PERIODIC HEALTH ASSESSMENT, PAP AND PELVIC: ICD-10-CM

## 2023-05-11 PROBLEM — M72.2 PLANTAR FASCIA SYNDROME: Status: RESOLVED | Noted: 2021-05-25 | Resolved: 2023-05-11

## 2023-05-11 PROBLEM — Z63.79 FAMILY ILLNESS: Status: RESOLVED | Noted: 2021-05-25 | Resolved: 2023-05-11

## 2023-05-11 PROBLEM — E66.01 SEVERE OBESITY (BMI 35.0-39.9) WITH COMORBIDITY (HCC): Status: RESOLVED | Noted: 2018-05-08 | Resolved: 2023-05-11

## 2023-05-11 LAB
ALBUMIN SERPL-MCNC: 3.6 G/DL (ref 3.5–5)
ALBUMIN/GLOB SERPL: 1 (ref 0.4–1.6)
ALP SERPL-CCNC: 87 U/L (ref 50–136)
ALT SERPL-CCNC: 33 U/L (ref 12–65)
ANION GAP SERPL CALC-SCNC: 6 MMOL/L (ref 2–11)
AST SERPL-CCNC: 19 U/L (ref 15–37)
BASOPHILS # BLD: 0 K/UL (ref 0–0.2)
BASOPHILS NFR BLD: 1 % (ref 0–2)
BILIRUB SERPL-MCNC: 0.4 MG/DL (ref 0.2–1.1)
BILIRUBIN, URINE, POC: NEGATIVE
BLOOD URINE, POC: NORMAL
BUN SERPL-MCNC: 15 MG/DL (ref 6–23)
CALCIUM SERPL-MCNC: 8.9 MG/DL (ref 8.3–10.4)
CHLORIDE SERPL-SCNC: 109 MMOL/L (ref 101–110)
CHOLEST SERPL-MCNC: 192 MG/DL
CO2 SERPL-SCNC: 23 MMOL/L (ref 21–32)
CREAT SERPL-MCNC: 0.6 MG/DL (ref 0.6–1)
DIFFERENTIAL METHOD BLD: NORMAL
EOSINOPHIL # BLD: 0.1 K/UL (ref 0–0.8)
EOSINOPHIL NFR BLD: 1 % (ref 0.5–7.8)
ERYTHROCYTE [DISTWIDTH] IN BLOOD BY AUTOMATED COUNT: 14.5 % (ref 11.9–14.6)
GLOBULIN SER CALC-MCNC: 3.6 G/DL (ref 2.8–4.5)
GLUCOSE SERPL-MCNC: 97 MG/DL (ref 65–100)
GLUCOSE URINE, POC: NEGATIVE
HCT VFR BLD AUTO: 43.2 % (ref 35.8–46.3)
HDLC SERPL-MCNC: 61 MG/DL (ref 40–60)
HDLC SERPL: 3.1
HGB BLD-MCNC: 13.8 G/DL (ref 11.7–15.4)
IMM GRANULOCYTES # BLD AUTO: 0 K/UL (ref 0–0.5)
IMM GRANULOCYTES NFR BLD AUTO: 0 % (ref 0–5)
KETONES, URINE, POC: NEGATIVE
LDLC SERPL CALC-MCNC: 115.4 MG/DL
LEUKOCYTE ESTERASE, URINE, POC: NORMAL
LYMPHOCYTES # BLD: 1.5 K/UL (ref 0.5–4.6)
LYMPHOCYTES NFR BLD: 33 % (ref 13–44)
MCH RBC QN AUTO: 26.6 PG (ref 26.1–32.9)
MCHC RBC AUTO-ENTMCNC: 31.9 G/DL (ref 31.4–35)
MCV RBC AUTO: 83.4 FL (ref 82–102)
MONOCYTES # BLD: 0.4 K/UL (ref 0.1–1.3)
MONOCYTES NFR BLD: 8 % (ref 4–12)
NEUTS SEG # BLD: 2.7 K/UL (ref 1.7–8.2)
NEUTS SEG NFR BLD: 57 % (ref 43–78)
NITRITE, URINE, POC: NEGATIVE
NRBC # BLD: 0 K/UL (ref 0–0.2)
PH, URINE, POC: 6 (ref 4.6–8)
PLATELET # BLD AUTO: 239 K/UL (ref 150–450)
PMV BLD AUTO: 11.7 FL (ref 9.4–12.3)
POTASSIUM SERPL-SCNC: 4 MMOL/L (ref 3.5–5.1)
PROT SERPL-MCNC: 7.2 G/DL (ref 6.3–8.2)
PROTEIN,URINE, POC: NEGATIVE
RBC # BLD AUTO: 5.18 M/UL (ref 4.05–5.2)
SODIUM SERPL-SCNC: 138 MMOL/L (ref 133–143)
SPECIFIC GRAVITY, URINE, POC: 1.01 (ref 1–1.03)
TRIGL SERPL-MCNC: 78 MG/DL (ref 35–150)
TSH, 3RD GENERATION: 1.4 UIU/ML (ref 0.36–3.74)
URINALYSIS CLARITY, POC: CLEAR
URINALYSIS COLOR, POC: YELLOW
UROBILINOGEN, POC: NORMAL
VLDLC SERPL CALC-MCNC: 15.6 MG/DL (ref 6–23)
WBC # BLD AUTO: 4.7 K/UL (ref 4.3–11.1)

## 2023-05-11 PROCEDURE — 81003 URINALYSIS AUTO W/O SCOPE: CPT | Performed by: FAMILY MEDICINE

## 2023-05-11 PROCEDURE — 99396 PREV VISIT EST AGE 40-64: CPT | Performed by: FAMILY MEDICINE

## 2023-05-11 PROCEDURE — 93000 ELECTROCARDIOGRAM COMPLETE: CPT | Performed by: FAMILY MEDICINE

## 2023-05-11 RX ORDER — MULTIVIT-MIN/IRON FUM/FOLIC AC 7.5 MG-4
1 TABLET ORAL DAILY
Qty: 100 TABLET | Refills: 3 | COMMUNITY
Start: 2023-05-11

## 2023-05-11 RX ORDER — ATORVASTATIN CALCIUM 10 MG/1
10 TABLET, FILM COATED ORAL DAILY
Qty: 90 TABLET | Refills: 5 | Status: SHIPPED | OUTPATIENT
Start: 2023-05-11

## 2023-05-11 RX ORDER — MONTELUKAST SODIUM 10 MG/1
10 TABLET ORAL NIGHTLY
Qty: 90 TABLET | Refills: 3 | Status: SHIPPED | OUTPATIENT
Start: 2023-05-11

## 2023-05-11 RX ORDER — BUSPIRONE HYDROCHLORIDE 5 MG/1
5 TABLET ORAL 2 TIMES DAILY
Qty: 180 TABLET | Refills: 5 | Status: SHIPPED | OUTPATIENT
Start: 2023-05-11

## 2023-05-11 SDOH — HEALTH STABILITY: PHYSICAL HEALTH: ON AVERAGE, HOW MANY DAYS PER WEEK DO YOU ENGAGE IN MODERATE TO STRENUOUS EXERCISE (LIKE A BRISK WALK)?: 4 DAYS

## 2023-05-11 SDOH — HEALTH STABILITY: PHYSICAL HEALTH: ON AVERAGE, HOW MANY MINUTES DO YOU ENGAGE IN EXERCISE AT THIS LEVEL?: 30 MIN

## 2023-05-11 ASSESSMENT — ENCOUNTER SYMPTOMS
CHEST TIGHTNESS: 0
COLOR CHANGE: 0
SINUS PRESSURE: 0
WHEEZING: 0
ABDOMINAL DISTENTION: 0
DIARRHEA: 0
VOMITING: 0
BACK PAIN: 0
CONSTIPATION: 0
NAUSEA: 0
VOICE CHANGE: 0
SHORTNESS OF BREATH: 1
SORE THROAT: 0
EYE REDNESS: 0
EYE DISCHARGE: 0
RECTAL PAIN: 0
TROUBLE SWALLOWING: 0
CHOKING: 0
RHINORRHEA: 0
ABDOMINAL PAIN: 0
EYE PAIN: 0
ANAL BLEEDING: 0
BLOOD IN STOOL: 0
PHOTOPHOBIA: 0
COUGH: 0
SINUS PAIN: 0

## 2023-05-11 ASSESSMENT — PATIENT HEALTH QUESTIONNAIRE - PHQ9
2. FEELING DOWN, DEPRESSED OR HOPELESS: 0
SUM OF ALL RESPONSES TO PHQ QUESTIONS 1-9: 0
1. LITTLE INTEREST OR PLEASURE IN DOING THINGS: 0
SUM OF ALL RESPONSES TO PHQ9 QUESTIONS 1 & 2: 0

## 2023-05-11 ASSESSMENT — VISUAL ACUITY
OS_CC: 20/20
OD_CC: 20/20

## 2023-05-11 NOTE — PROGRESS NOTES
Here to establish primary care and annual physical.  BMI 32. Has lost significant weight through diet exercise. Family history significant for diabetes hypertension no premature cardiac disease. She had nonspecific shortness of breath this been going on for a long time. She attributes this to anxiety. BuSpar is working for anxiety refills given. She also has seasonal allergies. She has started taking Nasacort over-the-counter and will continue Singulair for allergic rhinitis. She is perimenopausal somewhat irregular. Status post tubal ligation due for Pap smear scheduled mammogram last year was normal.  Colon cancer screening discussed schedule colonoscopy. She denies any exertional dyspnea chest pain. No smoking alcohol or drug abuse. Previously history of tubal ligation  section. Review of Systems   Constitutional:  Negative for activity change, appetite change, chills, diaphoresis, fatigue, fever and unexpected weight change. HENT:  Negative for congestion, dental problem, drooling, ear discharge, ear pain, hearing loss, nosebleeds, rhinorrhea, sinus pressure, sinus pain, sore throat, trouble swallowing and voice change. Seasonal allergic rhinitis. Taking Nasacort over-the-counter previously taken Flonase thinks that was making her throat dry. On Singulair refills given. Eyes:  Negative for photophobia, pain, discharge, redness and visual disturbance. Respiratory:  Positive for shortness of breath. Negative for cough, choking, chest tightness and wheezing. Cardiovascular:  Negative for chest pain, palpitations and leg swelling. History palpitation   Gastrointestinal:  Negative for abdominal distention, abdominal pain, anal bleeding, blood in stool, constipation, diarrhea, nausea, rectal pain and vomiting. Endocrine: Negative for cold intolerance, heat intolerance, polydipsia, polyphagia and polyuria.    Genitourinary:  Negative for decreased urine volume,

## 2023-05-11 NOTE — PATIENT INSTRUCTIONS
.Flu shot COVID vaccination recommended if not taken     Increase fruits vegetables, fiber, whole grains, beans, exercise, tree nuts, will decrease risk of heart attacks and strokes, may reduce cancer risks     once a day multivitamin such as Centrum silver store brand, due to benefit of folic acid vitamin D, has already mineral vitamin is recommended doses.   Multiple different vitamins not recommended may carry increased risk, including vitamin E, take foods rich in omega 3 and fibre, pills are not recommended, including omega 3 in high doses, may have increased risks

## 2023-05-12 LAB
EST. AVERAGE GLUCOSE BLD GHB EST-MCNC: 105 MG/DL
HBA1C MFR BLD: 5.3 % (ref 4.8–5.6)
HCV AB SER QL: NONREACTIVE

## 2023-05-14 LAB
HIV 1+2 AB+HIV1 P24 AG SERPL QL IA: NONREACTIVE
HIV 1/2 RESULT COMMENT: NORMAL

## 2023-06-20 SDOH — ECONOMIC STABILITY: FOOD INSECURITY: WITHIN THE PAST 12 MONTHS, YOU WORRIED THAT YOUR FOOD WOULD RUN OUT BEFORE YOU GOT MONEY TO BUY MORE.: NEVER TRUE

## 2023-06-20 SDOH — ECONOMIC STABILITY: INCOME INSECURITY: HOW HARD IS IT FOR YOU TO PAY FOR THE VERY BASICS LIKE FOOD, HOUSING, MEDICAL CARE, AND HEATING?: NOT VERY HARD

## 2023-06-20 SDOH — ECONOMIC STABILITY: TRANSPORTATION INSECURITY
IN THE PAST 12 MONTHS, HAS LACK OF TRANSPORTATION KEPT YOU FROM MEETINGS, WORK, OR FROM GETTING THINGS NEEDED FOR DAILY LIVING?: NO

## 2023-06-20 SDOH — ECONOMIC STABILITY: FOOD INSECURITY: WITHIN THE PAST 12 MONTHS, THE FOOD YOU BOUGHT JUST DIDN'T LAST AND YOU DIDN'T HAVE MONEY TO GET MORE.: NEVER TRUE

## 2023-06-20 SDOH — ECONOMIC STABILITY: HOUSING INSECURITY
IN THE LAST 12 MONTHS, WAS THERE A TIME WHEN YOU DID NOT HAVE A STEADY PLACE TO SLEEP OR SLEPT IN A SHELTER (INCLUDING NOW)?: NO

## 2023-06-21 ENCOUNTER — OFFICE VISIT (OUTPATIENT)
Dept: OBGYN CLINIC | Age: 53
End: 2023-06-21
Payer: COMMERCIAL

## 2023-06-21 VITALS
HEIGHT: 60 IN | SYSTOLIC BLOOD PRESSURE: 132 MMHG | DIASTOLIC BLOOD PRESSURE: 84 MMHG | WEIGHT: 169 LBS | BODY MASS INDEX: 33.18 KG/M2

## 2023-06-21 DIAGNOSIS — N95.1 PERIMENOPAUSAL VASOMOTOR SYMPTOMS: ICD-10-CM

## 2023-06-21 DIAGNOSIS — Z01.411 ENCOUNTER FOR GYNECOLOGICAL EXAMINATION (GENERAL) (ROUTINE) WITH ABNORMAL FINDINGS: Primary | ICD-10-CM

## 2023-06-21 DIAGNOSIS — N39.3 SUI (STRESS URINARY INCONTINENCE, FEMALE): ICD-10-CM

## 2023-06-21 DIAGNOSIS — Z12.4 ENCOUNTER FOR PAPANICOLAOU SMEAR FOR CERVICAL CANCER SCREENING: ICD-10-CM

## 2023-06-21 DIAGNOSIS — Z12.31 ENCOUNTER FOR SCREENING MAMMOGRAM FOR BREAST CANCER: ICD-10-CM

## 2023-06-21 DIAGNOSIS — F41.8 ANXIETY WITH DEPRESSION: ICD-10-CM

## 2023-06-21 PROCEDURE — 99386 PREV VISIT NEW AGE 40-64: CPT | Performed by: OBSTETRICS & GYNECOLOGY

## 2023-06-21 ASSESSMENT — PATIENT HEALTH QUESTIONNAIRE - PHQ9
9. THOUGHTS THAT YOU WOULD BE BETTER OFF DEAD, OR OF HURTING YOURSELF: 0
3. TROUBLE FALLING OR STAYING ASLEEP: 1
5. POOR APPETITE OR OVEREATING: 0
2. FEELING DOWN, DEPRESSED OR HOPELESS: 1
SUM OF ALL RESPONSES TO PHQ QUESTIONS 1-9: 5
SUM OF ALL RESPONSES TO PHQ QUESTIONS 1-9: 5
8. MOVING OR SPEAKING SO SLOWLY THAT OTHER PEOPLE COULD HAVE NOTICED. OR THE OPPOSITE, BEING SO FIGETY OR RESTLESS THAT YOU HAVE BEEN MOVING AROUND A LOT MORE THAN USUAL: 0
10. IF YOU CHECKED OFF ANY PROBLEMS, HOW DIFFICULT HAVE THESE PROBLEMS MADE IT FOR YOU TO DO YOUR WORK, TAKE CARE OF THINGS AT HOME, OR GET ALONG WITH OTHER PEOPLE: 1
6. FEELING BAD ABOUT YOURSELF - OR THAT YOU ARE A FAILURE OR HAVE LET YOURSELF OR YOUR FAMILY DOWN: 1
4. FEELING TIRED OR HAVING LITTLE ENERGY: 1
1. LITTLE INTEREST OR PLEASURE IN DOING THINGS: 1
7. TROUBLE CONCENTRATING ON THINGS, SUCH AS READING THE NEWSPAPER OR WATCHING TELEVISION: 0
SUM OF ALL RESPONSES TO PHQ QUESTIONS 1-9: 5
SUM OF ALL RESPONSES TO PHQ QUESTIONS 1-9: 5
SUM OF ALL RESPONSES TO PHQ9 QUESTIONS 1 & 2: 2

## 2023-06-21 NOTE — PROGRESS NOTES
New patient here for AE. Patient has seen Dr. Thiago Méndez at [facility]. LAST PAP: last year 06/2023, denies abnormal pap smears. LAST MAMMO:  6/22/2023     LMP:  Patient's last menstrual period was 04/24/2023 (approximate).     BIRTH CONTROL:  tubal ligation    TOBACCO USE: no      FAMILY HISTORY OF:   Breast Cancer:  No   Ovarian Cancer:  No   Uterine Cancer:  No   Colon Cancer:  No    Vitals:    06/21/23 0957   BP: 132/84   Site: Right Upper Arm   Position: Sitting   Weight: 169 lb (76.7 kg)   Height: 5' (1.524 m)        Helder Cleveland RN  06/21/23  10:07 AM
abdomen soft and non-tender without masses, organomegaly, or hernias noted     Pelvic Exam:       External: normal female genitalia without lesions or masses       Vagina: normal without lesions or masses       Cervix: normal without lesions or masses       Adnexa: normal bimanual exam without masses or fullness       Uterus: normal by palpation       Pap smear: performed     Msk:   no deformity or scoliosis noted with normal posture and gait     Extremities: no clubbing, cyanosis, edema, or deformity noted with normal full range of motion of all joints     Neurologic:  no focal deficits,normal coordination, muscle strength and tone     Skin:   intact without lesions or rashes     Cervical Nodes:  no significant adenopathy     Axillary Nodes:   no significant adenopathy     Psych:  alert and cooperative; normal mood and affect; normal attention span and concentration      Fay Bhatti MD   1:23 PM  06/21/23

## 2023-06-27 LAB
CYTOLOGIST CVX/VAG CYTO: NORMAL
CYTOLOGY CVX/VAG DOC THIN PREP: NORMAL
HPV APTIMA: NEGATIVE
HPV GENOTYPE REFLEX: NORMAL
Lab: NORMAL
PATH REPORT.FINAL DX SPEC: NORMAL
STAT OF ADQ CVX/VAG CYTO-IMP: NORMAL

## 2023-07-15 ENCOUNTER — HOSPITAL ENCOUNTER (OUTPATIENT)
Dept: MAMMOGRAPHY | Age: 53
End: 2023-07-15
Attending: OBSTETRICS & GYNECOLOGY
Payer: COMMERCIAL

## 2023-07-15 DIAGNOSIS — Z12.31 ENCOUNTER FOR SCREENING MAMMOGRAM FOR BREAST CANCER: ICD-10-CM

## 2023-07-15 PROCEDURE — 77063 BREAST TOMOSYNTHESIS BI: CPT

## 2023-07-18 ENCOUNTER — COMMUNITY OUTREACH (OUTPATIENT)
Dept: PRIMARY CARE CLINIC | Facility: CLINIC | Age: 53
End: 2023-07-18

## 2023-08-09 ENCOUNTER — INITIAL CONSULT (OUTPATIENT)
Age: 53
End: 2023-08-09
Payer: COMMERCIAL

## 2023-08-09 VITALS
HEIGHT: 60 IN | HEART RATE: 76 BPM | BODY MASS INDEX: 33.85 KG/M2 | WEIGHT: 172.4 LBS | DIASTOLIC BLOOD PRESSURE: 84 MMHG | SYSTOLIC BLOOD PRESSURE: 124 MMHG

## 2023-08-09 DIAGNOSIS — R07.89 ATYPICAL CHEST PAIN: Primary | ICD-10-CM

## 2023-08-09 DIAGNOSIS — R06.09 DOE (DYSPNEA ON EXERTION): ICD-10-CM

## 2023-08-09 DIAGNOSIS — E78.5 HYPERLIPIDEMIA, UNSPECIFIED HYPERLIPIDEMIA TYPE: ICD-10-CM

## 2023-08-09 PROCEDURE — 93000 ELECTROCARDIOGRAM COMPLETE: CPT | Performed by: INTERNAL MEDICINE

## 2023-08-09 PROCEDURE — 99204 OFFICE O/P NEW MOD 45 MIN: CPT | Performed by: INTERNAL MEDICINE

## 2023-08-31 ENCOUNTER — TELEPHONE (OUTPATIENT)
Dept: PRIMARY CARE CLINIC | Facility: CLINIC | Age: 53
End: 2023-08-31

## 2023-10-02 ENCOUNTER — TELEPHONE (OUTPATIENT)
Age: 53
End: 2023-10-02

## 2023-10-02 NOTE — TELEPHONE ENCOUNTER
----- Message from Sabrina Godinez MD sent at 9/29/2023  9:00 PM EDT -----  Please let the patient know the stress test was negative for myocardial ischemia.

## 2023-10-05 ENCOUNTER — CLINICAL DOCUMENTATION (OUTPATIENT)
Dept: SURGERY | Age: 53
End: 2023-10-05

## 2023-10-05 NOTE — PROGRESS NOTES
I have reviewed the patient's chart and consider the patient an acceptable risk for screening colonoscopy without a formal office visit. We will contact the patient to give the details of the bowel prep and to schedule screening colonoscopy in the near future. Colonoscopy: none on file in Epic  Anticoagulation: none  Family Hx: non contributory     Once the colonoscopy has been completed, the Health Maintenance will be updated accordingly.      Vickki Cockayne, APRN - CNP

## 2024-06-25 ENCOUNTER — TRANSCRIBE ORDERS (OUTPATIENT)
Dept: SCHEDULING | Age: 54
End: 2024-06-25

## 2024-06-25 DIAGNOSIS — Z12.31 SCREENING MAMMOGRAM FOR HIGH-RISK PATIENT: Primary | ICD-10-CM

## 2024-07-27 ENCOUNTER — HOSPITAL ENCOUNTER (OUTPATIENT)
Dept: MAMMOGRAPHY | Age: 54
End: 2024-07-27
Attending: OBSTETRICS & GYNECOLOGY
Payer: COMMERCIAL

## 2024-07-27 VITALS — BODY MASS INDEX: 34.18 KG/M2 | WEIGHT: 175 LBS

## 2024-07-27 DIAGNOSIS — Z12.31 SCREENING MAMMOGRAM FOR HIGH-RISK PATIENT: ICD-10-CM

## 2024-07-27 PROCEDURE — 77063 BREAST TOMOSYNTHESIS BI: CPT

## 2024-09-11 ENCOUNTER — OFFICE VISIT (OUTPATIENT)
Dept: OBGYN CLINIC | Age: 54
End: 2024-09-11

## 2024-09-11 VITALS
SYSTOLIC BLOOD PRESSURE: 122 MMHG | HEIGHT: 60 IN | WEIGHT: 180 LBS | BODY MASS INDEX: 35.34 KG/M2 | DIASTOLIC BLOOD PRESSURE: 84 MMHG

## 2024-09-11 DIAGNOSIS — Z12.12 SCREENING FOR COLORECTAL CANCER: ICD-10-CM

## 2024-09-11 DIAGNOSIS — N95.1 PERIMENOPAUSAL VASOMOTOR SYMPTOMS: ICD-10-CM

## 2024-09-11 DIAGNOSIS — N89.8 VAGINAL ITCHING: ICD-10-CM

## 2024-09-11 DIAGNOSIS — N94.10 DYSPAREUNIA IN FEMALE: ICD-10-CM

## 2024-09-11 DIAGNOSIS — Z01.411 ENCOUNTER FOR GYNECOLOGICAL EXAMINATION (GENERAL) (ROUTINE) WITH ABNORMAL FINDINGS: Primary | ICD-10-CM

## 2024-09-11 DIAGNOSIS — Z12.11 SCREENING FOR COLORECTAL CANCER: ICD-10-CM

## 2024-09-11 DIAGNOSIS — N95.2 VAGINAL ATROPHY: ICD-10-CM

## 2024-09-11 DIAGNOSIS — E66.01 SEVERE OBESITY (BMI 35.0-39.9) WITH COMORBIDITY (HCC): ICD-10-CM

## 2024-09-11 LAB
BILIRUBIN, URINE, POC: NEGATIVE
BLOOD URINE, POC: NEGATIVE
GLUCOSE URINE, POC: NEGATIVE
KETONES, URINE, POC: NEGATIVE
LEUKOCYTE ESTERASE, URINE, POC: NEGATIVE
NITRITE, URINE, POC: NEGATIVE
PH, URINE, POC: 6.5 (ref 4.6–8)
PROTEIN,URINE, POC: NEGATIVE
SPECIFIC GRAVITY, URINE, POC: 1.02 (ref 1–1.03)
URINALYSIS CLARITY, POC: CLEAR
URINALYSIS COLOR, POC: YELLOW
UROBILINOGEN, POC: NORMAL

## 2024-09-11 RX ORDER — ESTRADIOL 0.1 MG/G
CREAM VAGINAL
Qty: 42.5 G | Refills: 5 | Status: SHIPPED | OUTPATIENT
Start: 2024-09-11

## 2024-09-11 SDOH — ECONOMIC STABILITY: FOOD INSECURITY: WITHIN THE PAST 12 MONTHS, YOU WORRIED THAT YOUR FOOD WOULD RUN OUT BEFORE YOU GOT MONEY TO BUY MORE.: NEVER TRUE

## 2024-09-11 SDOH — ECONOMIC STABILITY: INCOME INSECURITY: HOW HARD IS IT FOR YOU TO PAY FOR THE VERY BASICS LIKE FOOD, HOUSING, MEDICAL CARE, AND HEATING?: NOT HARD AT ALL

## 2024-09-11 SDOH — ECONOMIC STABILITY: FOOD INSECURITY: WITHIN THE PAST 12 MONTHS, THE FOOD YOU BOUGHT JUST DIDN'T LAST AND YOU DIDN'T HAVE MONEY TO GET MORE.: NEVER TRUE

## 2024-09-17 LAB
A VAGINAE DNA VAG QL NAA+PROBE: NORMAL SCORE
BVAB2 DNA VAG QL NAA+PROBE: NORMAL SCORE
C ALBICANS DNA VAG QL NAA+PROBE: NEGATIVE
C GLABRATA DNA VAG QL NAA+PROBE: NEGATIVE
MEGA1 DNA VAG QL NAA+PROBE: NORMAL SCORE
SPECIMEN SOURCE: NORMAL
T VAGINALIS RRNA SPEC QL NAA+PROBE: NEGATIVE

## 2024-09-18 ENCOUNTER — PREP FOR PROCEDURE (OUTPATIENT)
Age: 54
End: 2024-09-18

## 2024-09-18 ENCOUNTER — TELEPHONE (OUTPATIENT)
Age: 54
End: 2024-09-18

## 2024-09-18 DIAGNOSIS — Z12.11 ENCOUNTER FOR SCREENING COLONOSCOPY: ICD-10-CM

## 2024-09-18 DIAGNOSIS — Z12.11 ENCOUNTER FOR SCREENING COLONOSCOPY: Primary | ICD-10-CM

## 2024-09-18 RX ORDER — SODIUM, POTASSIUM,MAG SULFATES 17.5-3.13G
1 SOLUTION, RECONSTITUTED, ORAL ORAL ONCE
Qty: 1 EACH | Refills: 0 | Status: SHIPPED | OUTPATIENT
Start: 2024-09-18 | End: 2024-09-18

## 2024-09-18 RX ORDER — SODIUM CHLORIDE 0.9 % (FLUSH) 0.9 %
5-40 SYRINGE (ML) INJECTION PRN
Status: CANCELLED | OUTPATIENT
Start: 2024-09-18

## 2024-09-18 RX ORDER — SODIUM CHLORIDE 9 MG/ML
25 INJECTION, SOLUTION INTRAVENOUS PRN
Status: CANCELLED | OUTPATIENT
Start: 2024-09-18

## 2024-09-18 RX ORDER — SODIUM CHLORIDE 0.9 % (FLUSH) 0.9 %
5-40 SYRINGE (ML) INJECTION EVERY 12 HOURS SCHEDULED
Status: CANCELLED | OUTPATIENT
Start: 2024-09-18

## 2024-10-10 NOTE — PROGRESS NOTES
[facility] CARDIOLOGY Follow Up                 Reason for Visit: Cardiac risk assessment prior to noncardiac surgery    Subjective:     Patient is a 53 y.o. female with a PMH of hyperlipidemia who presents for follow-up.  The patient was last seen in 2023.  The proposed procedure is a colonoscopy.  An CLEMENCIA was ordered for atypical chest pain and GANDHI.  She had an CLEMENCIA in 2023 that was noted to demonstrate a normal EF and was noted to be negative for myocardial ischemia.  Her exercise capacity was above average, and she experienced no angina during the stress test.  She denies angina.  The patient notes \"some trouble breathing\" that she thinks is related to \"asthma\" since it is \"worse on high pollen days\". This has been an issue for years.  She reports palpitations worse with caffeine, and only drinks a green tea in the AM now.  The palpitations are minimally symptomatic and well tolerated.      Past Medical History:   Diagnosis Date    B12 deficiency 3/9/2015    Esophagitis determined by endoscopy 2016    Gastrointestinal disorder     reflux    GERD (gastroesophageal reflux disease) 2015    Heart palpitations     Hyperlipidemia LDL goal < 100 3/9/2015    Hypertension     no meds    Palpitations 2015    Passive smoker 2015    Psychiatric disorder     anxiety, panic    Vitamin D deficiency 3/9/2015      Past Surgical History:   Procedure Laterality Date     SECTION          CHOLECYSTECTOMY      TUBAL LIGATION        Family History   Problem Relation Age of Onset    Cancer Maternal Aunt         cervical    Stroke Mother     Elevated Lipids Mother     Hypertension Father     Breast Cancer Neg Hx       Social History     Tobacco Use    Smoking status: Never    Smokeless tobacco: Never   Substance Use Topics    Alcohol use: Yes     Alcohol/week: 2.0 standard drinks of alcohol     Types: 1 Glasses of wine, 1 Drinks containing 0.5 oz of alcohol per week      Allergies   Allergen

## 2024-10-11 ENCOUNTER — OFFICE VISIT (OUTPATIENT)
Age: 54
End: 2024-10-11
Payer: COMMERCIAL

## 2024-10-11 VITALS
DIASTOLIC BLOOD PRESSURE: 88 MMHG | SYSTOLIC BLOOD PRESSURE: 138 MMHG | WEIGHT: 181 LBS | BODY MASS INDEX: 36.49 KG/M2 | HEIGHT: 59 IN | HEART RATE: 67 BPM

## 2024-10-11 DIAGNOSIS — Z01.810 PREOPERATIVE CARDIOVASCULAR EXAMINATION: ICD-10-CM

## 2024-10-11 DIAGNOSIS — E78.5 HYPERLIPIDEMIA, UNSPECIFIED HYPERLIPIDEMIA TYPE: Primary | ICD-10-CM

## 2024-10-11 DIAGNOSIS — I49.1 PAC (PREMATURE ATRIAL CONTRACTION): ICD-10-CM

## 2024-10-11 DIAGNOSIS — R06.09 CHRONIC DYSPNEA: ICD-10-CM

## 2024-10-11 PROCEDURE — 99214 OFFICE O/P EST MOD 30 MIN: CPT | Performed by: INTERNAL MEDICINE

## 2024-10-11 PROCEDURE — 93000 ELECTROCARDIOGRAM COMPLETE: CPT | Performed by: INTERNAL MEDICINE

## 2024-10-11 RX ORDER — ATORVASTATIN CALCIUM 10 MG/1
10 TABLET, FILM COATED ORAL DAILY
Qty: 90 TABLET | Refills: 3 | Status: SHIPPED | OUTPATIENT
Start: 2024-10-11

## 2024-10-18 PROBLEM — Z12.11 ENCOUNTER FOR SCREENING COLONOSCOPY: Status: RESOLVED | Noted: 2024-09-18 | Resolved: 2024-10-18

## 2024-10-25 ENCOUNTER — TELEPHONE (OUTPATIENT)
Age: 54
End: 2024-10-25

## 2024-10-25 NOTE — TELEPHONE ENCOUNTER
The patient is scheduled for a colonoscopy on 12/30/2024 due to provider schedule change the procedure will have to be rescheduled. Called and left a voicemail for the patient to return call.

## 2024-10-29 ENCOUNTER — PREP FOR PROCEDURE (OUTPATIENT)
Age: 54
End: 2024-10-29

## 2024-10-29 ENCOUNTER — TELEPHONE (OUTPATIENT)
Age: 54
End: 2024-10-29

## 2024-10-29 DIAGNOSIS — Z12.11 ENCOUNTER FOR SCREENING COLONOSCOPY: ICD-10-CM

## 2024-10-29 RX ORDER — SODIUM CHLORIDE 0.9 % (FLUSH) 0.9 %
5-40 SYRINGE (ML) INJECTION EVERY 12 HOURS SCHEDULED
Status: CANCELLED | OUTPATIENT
Start: 2024-10-29

## 2024-10-29 RX ORDER — SODIUM CHLORIDE 0.9 % (FLUSH) 0.9 %
5-40 SYRINGE (ML) INJECTION PRN
Status: CANCELLED | OUTPATIENT
Start: 2024-10-29

## 2024-10-29 RX ORDER — SODIUM CHLORIDE 9 MG/ML
25 INJECTION, SOLUTION INTRAVENOUS PRN
Status: CANCELLED | OUTPATIENT
Start: 2024-10-29

## 2024-10-29 NOTE — TELEPHONE ENCOUNTER
The patient is scheduled for a colonoscopy on 12/30/2024 due to provider schedule change the procedure will have to be rescheduled. Called and spoke with the patient, rescheduled for 12/23/2024 at [facility].

## 2024-11-03 SDOH — HEALTH STABILITY: PHYSICAL HEALTH: ON AVERAGE, HOW MANY MINUTES DO YOU ENGAGE IN EXERCISE AT THIS LEVEL?: 20 MIN

## 2024-11-03 SDOH — HEALTH STABILITY: PHYSICAL HEALTH: ON AVERAGE, HOW MANY DAYS PER WEEK DO YOU ENGAGE IN MODERATE TO STRENUOUS EXERCISE (LIKE A BRISK WALK)?: 5 DAYS

## 2024-11-04 ENCOUNTER — OFFICE VISIT (OUTPATIENT)
Dept: PRIMARY CARE CLINIC | Facility: CLINIC | Age: 54
End: 2024-11-04
Payer: COMMERCIAL

## 2024-11-04 VITALS
WEIGHT: 181.2 LBS | HEART RATE: 81 BPM | DIASTOLIC BLOOD PRESSURE: 88 MMHG | OXYGEN SATURATION: 97 % | BODY MASS INDEX: 36.53 KG/M2 | SYSTOLIC BLOOD PRESSURE: 132 MMHG | HEIGHT: 59 IN

## 2024-11-04 DIAGNOSIS — Z12.11 COLON CANCER SCREENING: ICD-10-CM

## 2024-11-04 DIAGNOSIS — R63.5 WEIGHT GAIN: ICD-10-CM

## 2024-11-04 DIAGNOSIS — R06.02 SHORTNESS OF BREATH: ICD-10-CM

## 2024-11-04 DIAGNOSIS — E78.2 MIXED HYPERLIPIDEMIA: ICD-10-CM

## 2024-11-04 DIAGNOSIS — I49.1 PAC (PREMATURE ATRIAL CONTRACTION): ICD-10-CM

## 2024-11-04 DIAGNOSIS — K21.9 GASTROESOPHAGEAL REFLUX DISEASE WITHOUT ESOPHAGITIS: ICD-10-CM

## 2024-11-04 DIAGNOSIS — E55.9 VITAMIN D DEFICIENCY: ICD-10-CM

## 2024-11-04 DIAGNOSIS — M79.673 PAIN OF FOOT, UNSPECIFIED LATERALITY: ICD-10-CM

## 2024-11-04 DIAGNOSIS — R73.9 ELEVATED BLOOD SUGAR: ICD-10-CM

## 2024-11-04 DIAGNOSIS — F41.8 ANXIETY WITH DEPRESSION: Primary | ICD-10-CM

## 2024-11-04 DIAGNOSIS — R10.30 INGUINAL PAIN, UNSPECIFIED LATERALITY: ICD-10-CM

## 2024-11-04 DIAGNOSIS — R06.09 CHRONIC DYSPNEA: ICD-10-CM

## 2024-11-04 LAB
25(OH)D3 SERPL-MCNC: 19.4 NG/ML (ref 30–100)
ALBUMIN SERPL-MCNC: 3.6 G/DL (ref 3.5–5)
ALBUMIN/GLOB SERPL: 1.1 (ref 1–1.9)
ALP SERPL-CCNC: 93 U/L (ref 35–104)
ALT SERPL-CCNC: 28 U/L (ref 8–45)
ANION GAP SERPL CALC-SCNC: 10 MMOL/L (ref 7–16)
AST SERPL-CCNC: 26 U/L (ref 15–37)
BASOPHILS # BLD: 0 K/UL (ref 0–0.2)
BASOPHILS NFR BLD: 1 % (ref 0–2)
BILIRUB SERPL-MCNC: 0.3 MG/DL (ref 0–1.2)
BUN SERPL-MCNC: 13 MG/DL (ref 6–23)
CALCIUM SERPL-MCNC: 9 MG/DL (ref 8.8–10.2)
CHLORIDE SERPL-SCNC: 107 MMOL/L (ref 98–107)
CHOLEST SERPL-MCNC: 153 MG/DL (ref 0–200)
CO2 SERPL-SCNC: 24 MMOL/L (ref 20–29)
CREAT SERPL-MCNC: 0.73 MG/DL (ref 0.6–1.1)
DIFFERENTIAL METHOD BLD: ABNORMAL
EOSINOPHIL # BLD: 0.1 K/UL (ref 0–0.8)
EOSINOPHIL NFR BLD: 2 % (ref 0.5–7.8)
ERYTHROCYTE [DISTWIDTH] IN BLOOD BY AUTOMATED COUNT: 13.8 % (ref 11.9–14.6)
EST. AVERAGE GLUCOSE BLD GHB EST-MCNC: 121 MG/DL
GLOBULIN SER CALC-MCNC: 3.3 G/DL (ref 2.3–3.5)
GLUCOSE SERPL-MCNC: 97 MG/DL (ref 70–99)
HBA1C MFR BLD: 5.9 % (ref 0–5.6)
HCT VFR BLD AUTO: 42.2 % (ref 35.8–46.3)
HDLC SERPL-MCNC: 46 MG/DL (ref 40–60)
HDLC SERPL: 3.3 (ref 0–5)
HGB BLD-MCNC: 13.2 G/DL (ref 11.7–15.4)
IMM GRANULOCYTES # BLD AUTO: 0 K/UL (ref 0–0.5)
IMM GRANULOCYTES NFR BLD AUTO: 0 % (ref 0–5)
LDLC SERPL CALC-MCNC: 87 MG/DL (ref 0–100)
LYMPHOCYTES # BLD: 2 K/UL (ref 0.5–4.6)
LYMPHOCYTES NFR BLD: 37 % (ref 13–44)
MCH RBC QN AUTO: 26.5 PG (ref 26.1–32.9)
MCHC RBC AUTO-ENTMCNC: 31.3 G/DL (ref 31.4–35)
MCV RBC AUTO: 84.6 FL (ref 82–102)
MONOCYTES # BLD: 0.4 K/UL (ref 0.1–1.3)
MONOCYTES NFR BLD: 8 % (ref 4–12)
NEUTS SEG # BLD: 2.9 K/UL (ref 1.7–8.2)
NEUTS SEG NFR BLD: 52 % (ref 43–78)
NRBC # BLD: 0 K/UL (ref 0–0.2)
PLATELET # BLD AUTO: 286 K/UL (ref 150–450)
PMV BLD AUTO: 11.2 FL (ref 9.4–12.3)
POTASSIUM SERPL-SCNC: 4.6 MMOL/L (ref 3.5–5.1)
PROT SERPL-MCNC: 6.9 G/DL (ref 6.3–8.2)
RBC # BLD AUTO: 4.99 M/UL (ref 4.05–5.2)
SODIUM SERPL-SCNC: 140 MMOL/L (ref 136–145)
TRIGL SERPL-MCNC: 97 MG/DL (ref 0–150)
TSH, 3RD GENERATION: 1.5 UIU/ML (ref 0.27–4.2)
VLDLC SERPL CALC-MCNC: 19 MG/DL (ref 6–23)
WBC # BLD AUTO: 5.5 K/UL (ref 4.3–11.1)

## 2024-11-04 PROCEDURE — 99215 OFFICE O/P EST HI 40 MIN: CPT | Performed by: INTERNAL MEDICINE

## 2024-11-04 ASSESSMENT — PATIENT HEALTH QUESTIONNAIRE - PHQ9
SUM OF ALL RESPONSES TO PHQ9 QUESTIONS 1 & 2: 1
8. MOVING OR SPEAKING SO SLOWLY THAT OTHER PEOPLE COULD HAVE NOTICED. OR THE OPPOSITE, BEING SO FIGETY OR RESTLESS THAT YOU HAVE BEEN MOVING AROUND A LOT MORE THAN USUAL: SEVERAL DAYS
9. THOUGHTS THAT YOU WOULD BE BETTER OFF DEAD, OR OF HURTING YOURSELF: NOT AT ALL
7. TROUBLE CONCENTRATING ON THINGS, SUCH AS READING THE NEWSPAPER OR WATCHING TELEVISION: SEVERAL DAYS
3. TROUBLE FALLING OR STAYING ASLEEP: NOT AT ALL
SUM OF ALL RESPONSES TO PHQ QUESTIONS 1-9: 6
1. LITTLE INTEREST OR PLEASURE IN DOING THINGS: NOT AT ALL
6. FEELING BAD ABOUT YOURSELF - OR THAT YOU ARE A FAILURE OR HAVE LET YOURSELF OR YOUR FAMILY DOWN: SEVERAL DAYS
SUM OF ALL RESPONSES TO PHQ QUESTIONS 1-9: 6
SUM OF ALL RESPONSES TO PHQ QUESTIONS 1-9: 6
5. POOR APPETITE OR OVEREATING: SEVERAL DAYS
SUM OF ALL RESPONSES TO PHQ QUESTIONS 1-9: 6
2. FEELING DOWN, DEPRESSED OR HOPELESS: SEVERAL DAYS
4. FEELING TIRED OR HAVING LITTLE ENERGY: SEVERAL DAYS
10. IF YOU CHECKED OFF ANY PROBLEMS, HOW DIFFICULT HAVE THESE PROBLEMS MADE IT FOR YOU TO DO YOUR WORK, TAKE CARE OF THINGS AT HOME, OR GET ALONG WITH OTHER PEOPLE: SOMEWHAT DIFFICULT

## 2024-11-04 ASSESSMENT — ENCOUNTER SYMPTOMS
RESPIRATORY NEGATIVE: 1
GASTROINTESTINAL NEGATIVE: 1

## 2024-11-04 NOTE — PROGRESS NOTES
FOLLOW UP VISIT    Subjective:    Mera Valles (: 1970) is a 54 y.o., female,   Chief Complaint   Patient presents with    [facility]     fasting       HPI:  54 year old in to [facility] care.  She is having groin pain .  She does go to Podiatry   1. B12 def   2. Esophagitis deteremined by EGD in 2016   3. GERD  4. Heart palpitations has seen Cardiology   5. High blood pressure  6. Anxiety  not taking buspar had a bad reaction  7. Vit d def   8. High cholesterol   9. Chornic SOB has had normal Cardiac etiol unknown   10. Obesity   9. HCM has GYN, Cardio due for labs, vaccines and cologuard           The following portions of the patient's history were reviewed and updated as appropriate:      Past Medical History:   Diagnosis Date    Anxiety     B12 deficiency 3/9/2015    Esophagitis determined by endoscopy 2016    Gastrointestinal disorder     reflux    GERD (gastroesophageal reflux disease) 2015    Heart palpitations     Hyperlipidemia LDL goal < 100 3/9/2015    Hypertension     no meds    Obesity     Palpitations 2015    Passive smoker 2015    Psychiatric disorder     anxiety, panic    Vitamin D deficiency 3/9/2015       Past Surgical History:   Procedure Laterality Date     SECTION          CHOLECYSTECTOMY      TUBAL LIGATION         Family History   Problem Relation Age of Onset    Cancer Maternal Aunt         cervical    Stroke Mother     Elevated Lipids Mother     Hypertension Father     Breast Cancer Neg Hx        Social History     Socioeconomic History    Marital status:      Spouse name: Not on file    Number of children: Not on file    Years of education: Not on file    Highest education level: Not on file   Occupational History    Not on file   Tobacco Use    Smoking status: Never    Smokeless tobacco: Never   Vaping Use    Vaping status: Never Used   Substance and Sexual Activity    Alcohol use: Yes     Alcohol/week: 2.0 standard drinks of

## 2024-11-10 PROBLEM — Z01.810 PREOPERATIVE CARDIOVASCULAR EXAMINATION: Status: RESOLVED | Noted: 2024-10-11 | Resolved: 2024-11-10

## 2024-11-12 ENCOUNTER — HOSPITAL ENCOUNTER (OUTPATIENT)
Dept: PHYSICAL THERAPY | Age: 54
Setting detail: RECURRING SERIES
Discharge: HOME | End: 2024-11-15
Attending: INTERNAL MEDICINE
Payer: COMMERCIAL

## 2024-11-12 DIAGNOSIS — M79.671 PAIN IN RIGHT FOOT: Primary | ICD-10-CM

## 2024-11-12 DIAGNOSIS — R10.31 RIGHT GROIN PAIN: ICD-10-CM

## 2024-11-12 PROCEDURE — 97110 THERAPEUTIC EXERCISES: CPT

## 2024-11-12 PROCEDURE — 97161 PT EVAL LOW COMPLEX 20 MIN: CPT

## 2024-11-12 NOTE — THERAPY EVALUATION
Mera Valles  : 1970  Primary: Mirna Jurado (Mery OLMEDO)  Secondary:  [facility]  [address]  [address]  [place] [address]  Phone: [phone]  Fax: [phone] Plan Frequency: 1 time a week  Plan of Care/Certification Expiration Date: 02/10/25        Plan of Care/Certification Expiration Date:  Plan of Care/Certification Expiration Date: 02/10/25    Frequency/Duration: Plan Frequency: 1 time a week      Time In/Out:   Time In: 1600  Time Out: 1645      PT Visit Info:    Total # of Visits to Date: 1      Visit Count:  1                OUTPATIENT PHYSICAL THERAPY:             Initial Assessment 2024               Episode (R groin/foot pain)         Treatment Diagnosis:     Pain in right foot  Right groin pain  Medical/Referring Diagnosis:    Inguinal pain, unspecified laterality [R10.30]  Pain of foot, unspecified laterality [M79.673]      Referring Physician:  Carolyn Cancino MD MD Orders:  PT Eval and Treat   Return MD Appt:  2025  Date of Onset:  Onset Date: 24     Allergies:  Tree nut [macadamia nut oil], Buckwheat, Cetirizine, and Cottonseed oil  Restrictions/Precautions:    None      Medications Last Reviewed:  2024     SUBJECTIVE   History of Injury/Illness (Reason for Referral):  Patient reports that during the summer she was attempting to increase and improve her overall physical activity level by walking for exercise.  States that she would approximately walk about 10,000-12,000 steps on a daily basis.  States that during this time she started experiencing some increased right anterior hip pain which wasn't too severe so she continued doing her typical walking program.  Does report that with time she started developing more and more pain to the point where she had to discontinue her walking.  Also reports that during this time she was barefoot and attempted to do a quick movement pushing off with her right foot when she felt significant pain

## 2024-11-13 ASSESSMENT — PAIN SCALES - GENERAL: PAINLEVEL_OUTOF10: 5

## 2024-11-20 ENCOUNTER — HOSPITAL ENCOUNTER (OUTPATIENT)
Dept: PHYSICAL THERAPY | Age: 54
Setting detail: RECURRING SERIES
Discharge: HOME | End: 2024-11-23
Attending: INTERNAL MEDICINE
Payer: COMMERCIAL

## 2024-11-20 PROCEDURE — 97110 THERAPEUTIC EXERCISES: CPT

## 2024-11-20 PROCEDURE — 97140 MANUAL THERAPY 1/> REGIONS: CPT

## 2024-11-24 ASSESSMENT — PAIN SCALES - GENERAL: PAINLEVEL_OUTOF10: 2

## 2024-11-27 ENCOUNTER — HOSPITAL ENCOUNTER (OUTPATIENT)
Dept: PHYSICAL THERAPY | Age: 54
Setting detail: RECURRING SERIES
Discharge: HOME | End: 2024-11-30
Attending: INTERNAL MEDICINE
Payer: COMMERCIAL

## 2024-11-27 PROCEDURE — 97110 THERAPEUTIC EXERCISES: CPT

## 2024-11-27 PROCEDURE — 97140 MANUAL THERAPY 1/> REGIONS: CPT

## 2024-11-27 ASSESSMENT — PAIN SCALES - GENERAL: PAINLEVEL_OUTOF10: 1

## 2024-11-27 NOTE — PROGRESS NOTES
8:15 AM [facility] DEP   3/4/2025  8:50 AM Carolyn Cancino MD [facility] DEP   4/22/2025  4:00 PM Dio Ordaz MD [facility] AMB   9/17/2025  3:30 PM Snow Mays MD [facility] AMB

## 2024-11-28 PROBLEM — Z12.11 ENCOUNTER FOR SCREENING COLONOSCOPY: Status: RESOLVED | Noted: 2024-10-29 | Resolved: 2024-11-28

## 2024-12-03 PROBLEM — Z12.11 ENCOUNTER FOR SCREENING COLONOSCOPY: Status: ACTIVE | Noted: 2024-10-29

## 2024-12-04 ENCOUNTER — HOSPITAL ENCOUNTER (OUTPATIENT)
Dept: PHYSICAL THERAPY | Age: 54
Setting detail: RECURRING SERIES
Discharge: HOME | End: 2024-12-07
Attending: INTERNAL MEDICINE
Payer: COMMERCIAL

## 2024-12-04 PROCEDURE — 97110 THERAPEUTIC EXERCISES: CPT

## 2024-12-04 PROCEDURE — 97140 MANUAL THERAPY 1/> REGIONS: CPT

## 2024-12-05 ASSESSMENT — PAIN SCALES - GENERAL: PAINLEVEL_OUTOF10: 1

## 2024-12-05 NOTE — PROGRESS NOTES
Mera Valles  : 1970  Primary: [facility] [facility] (Mery OLMEDO)  Secondary:  [facility]  [address]  [address]  [place] [address]  Phone: [phone]  Fax: [phone] Plan Frequency: 1 time a week    Plan of Care/Certification Expiration Date: 02/10/25        Plan of Care/Certification Expiration Date:  Plan of Care/Certification Expiration Date: 02/10/25    Frequency/Duration:   Plan Frequency: 1 time a week      Time In/Out:   Time In: 1645  Time Out: 1730      PT Visit Info:    Total # of Visits to Date: 4      Visit Count:  4    OUTPATIENT PHYSICAL THERAPY:   Treatment Note 2024       Episode  (R groin/foot pain)               Treatment Diagnosis:    Pain in right foot  Right groin pain  Medical/Referring Diagnosis:    Inguinal pain, unspecified laterality [R10.30]  Pain of foot, unspecified laterality [M79.673]      Referring Physician:  Carolyn Cancino MD MD Orders:  PT Eval and Treat   Return MD Appt:  2025   Date of Onset:  Onset Date: 24     Allergies:   Tree nut [macadamia nut oil], Buckwheat, Cetirizine, and Cottonseed oil  Restrictions/Precautions:   None      Interventions Planned (Treatment may consist of any combination of the following):     See Assessment Note    Subjective Comments:   Patient states that after our previous session she was feeling some increased pain throughout her right groin as well as some increase in her right foot.    Initial Pain Level::     1/10  Post Session Pain Level:       1/10  Medications Last Reviewed:  2024  Updated Objective Findings:  None Today  Treatment   THERAPEUTIC EXERCISE: (35 minutes):    Exercises per grid below to improve mobility and strength.  Required minimal visual, verbal, manual, and tactile cues to promote proper body alignment, promote proper body posture, and promote proper body mechanics.  Progressed resistance, range, and repetitions as indicated.   Date:   Date:   Date:

## 2024-12-11 ENCOUNTER — APPOINTMENT (OUTPATIENT)
Dept: PHYSICAL THERAPY | Age: 54
End: 2024-12-11
Attending: INTERNAL MEDICINE
Payer: COMMERCIAL

## 2024-12-17 NOTE — PROGRESS NOTES
Patient verified name, , and procedure.  PT STATES WAS DX WITH SIUS INFECTION 24 WAS TX WITH ROUND OF ANTIBIOTICS-- STATES MUCH BETTER BUT STILL HAS A OCC DRY COUGH-- DENIES FEVER-- PRODUCTIVE COUGH PROCEDURE WILL BE AT THE 4 WEEK LEONARDO. SPOKE TO DR BURDICK--OK WITH PT HAVING PROCEDURE  Type: 1a; abbreviated assessment per anesthesia guidelines    Labs per anesthesia: NONE    Instructed pt that they will be notified the day before their procedure by the GI Lab for time of arrival if their procedure is [place] and Pre-op for [place] cases. Arrival times should be called by 5 pm. If no phone is received the patient should contact their respective hospital. The GI lab telephone number is [phone] and ES Pre-op is [phone].     Follow diet and prep instructions per office including NPO status.      Bath or shower the night before and the am of surgery with non-moisturizing soap. No lotions, oils, powders, cologne on skin. No make up, eye make up or jewelry. Wear loose fitting comfortable, clean clothing.     Must have adult present in building the entire time .     Medications for the day of procedure NONE, patient to hold ALL VITAMINS AND SUPPLEMENTS per anesthesia guidelines.     The following discharge instructions reviewed with patient: medication given during procedure may cause drowsiness for several hours, therefore, do not drive or operate machinery for remainder of the day. You may not drink alcohol on the day of your procedure, please resume regular diet and activity unless otherwise directed. You may experience abdominal distention for several hours that is relieved by the passage of gas. Contact your physician if you have any of the following: fever or chills, severe abdominal pain or excessive amount of bleeding or a large amount when having a bowel movement. Occasional specks of blood with bowel movement would not be unusual.

## 2024-12-19 ENCOUNTER — HOSPITAL ENCOUNTER (OUTPATIENT)
Dept: PHYSICAL THERAPY | Age: 54
Setting detail: RECURRING SERIES
Discharge: HOME | End: 2024-12-22
Attending: INTERNAL MEDICINE
Payer: COMMERCIAL

## 2024-12-19 PROCEDURE — 97110 THERAPEUTIC EXERCISES: CPT

## 2024-12-19 ASSESSMENT — PAIN SCALES - GENERAL: PAINLEVEL_OUTOF10: 1

## 2024-12-19 NOTE — PROGRESS NOTES
Mera Valles  : 1970  Primary: [facility] (Mery MARTINEZBS)  Secondary:  [facility]  [address]  [address]  [place] [address]  Phone: [phone]  Fax: [phone] Plan Frequency: 1 time a week    Plan of Care/Certification Expiration Date: 02/10/25        Plan of Care/Certification Expiration Date:  Plan of Care/Certification Expiration Date: 02/10/25    Frequency/Duration:   Plan Frequency: 1 time a week      Time In/Out:   Time In: 0815  Time Out: 0900      PT Visit Info:    Total # of Visits to Date: 5      Visit Count:  5    OUTPATIENT PHYSICAL THERAPY:   Treatment Note 2024       Episode  (R groin/foot pain)               Treatment Diagnosis:    Pain in right foot  Right groin pain  Medical/Referring Diagnosis:    Inguinal pain, unspecified laterality [R10.30]  Pain of foot, unspecified laterality [M79.673]      Referring Physician:  Carolyn Cancino MD MD Orders:  PT Eval and Treat   Return MD Appt:  2025   Date of Onset:  Onset Date: 24     Allergies:   Tree nut [macadamia nut oil], Buckwheat, Cetirizine, and Cottonseed oil  Restrictions/Precautions:   None      Interventions Planned (Treatment may consist of any combination of the following):     See Assessment Note    Subjective Comments:   Patient reports that she got new shoes that she feels like her feet are still becoming accustomed to and has some increased soreness throughout her bilateral feet.    Initial Pain Level::     10  Post Session Pain Level:       /10  Medications Last Reviewed:  2024  Updated Objective Findings:  None Today  Treatment   THERAPEUTIC EXERCISE: (35 minutes):    Exercises per grid below to improve mobility and strength.  Required minimal visual, verbal, manual, and tactile cues to promote proper body alignment, promote proper body posture, and promote proper body mechanics.  Progressed resistance, range, and repetitions as indicated.   Date:   Date:

## 2024-12-20 NOTE — PROGRESS NOTES
RN called Pt to confirm appointment time of 0845, arrival time 0745, location,  requirement, and instructions for registration at the hospital.  Pt verbalized understanding.

## 2024-12-22 RX ORDER — NALOXONE HYDROCHLORIDE 0.4 MG/ML
INJECTION, SOLUTION INTRAMUSCULAR; INTRAVENOUS; SUBCUTANEOUS PRN
Status: CANCELLED | OUTPATIENT
Start: 2024-12-22

## 2024-12-23 ENCOUNTER — ANESTHESIA (OUTPATIENT)
Dept: ENDOSCOPY | Age: 54
End: 2024-12-23
Payer: COMMERCIAL

## 2024-12-23 ENCOUNTER — HOSPITAL ENCOUNTER (OUTPATIENT)
Age: 54
Discharge: HOME | End: 2024-12-23
Attending: INTERNAL MEDICINE | Admitting: INTERNAL MEDICINE
Payer: COMMERCIAL

## 2024-12-23 ENCOUNTER — ANESTHESIA EVENT (OUTPATIENT)
Dept: ENDOSCOPY | Age: 54
End: 2024-12-23
Payer: COMMERCIAL

## 2024-12-23 VITALS
WEIGHT: 177 LBS | BODY MASS INDEX: 35.68 KG/M2 | SYSTOLIC BLOOD PRESSURE: 142 MMHG | TEMPERATURE: 98 F | HEIGHT: 59 IN | DIASTOLIC BLOOD PRESSURE: 78 MMHG | RESPIRATION RATE: 16 BRPM | OXYGEN SATURATION: 98 % | HEART RATE: 73 BPM

## 2024-12-23 PROCEDURE — 45378 DIAGNOSTIC COLONOSCOPY: CPT | Performed by: INTERNAL MEDICINE

## 2024-12-23 PROCEDURE — 3609027000 HC COLONOSCOPY: Performed by: INTERNAL MEDICINE

## 2024-12-23 PROCEDURE — 6360000002 HC RX W HCPCS: Performed by: NURSE ANESTHETIST, CERTIFIED REGISTERED

## 2024-12-23 PROCEDURE — 3700000001 HC ADD 15 MINUTES (ANESTHESIA): Performed by: INTERNAL MEDICINE

## 2024-12-23 PROCEDURE — 2500000003 HC RX 250 WO HCPCS: Performed by: ANESTHESIOLOGY

## 2024-12-23 PROCEDURE — 7100000011 HC PHASE II RECOVERY - ADDTL 15 MIN: Performed by: INTERNAL MEDICINE

## 2024-12-23 PROCEDURE — 2709999900 HC NON-CHARGEABLE SUPPLY: Performed by: INTERNAL MEDICINE

## 2024-12-23 PROCEDURE — 3700000000 HC ANESTHESIA ATTENDED CARE: Performed by: INTERNAL MEDICINE

## 2024-12-23 PROCEDURE — 7100000010 HC PHASE II RECOVERY - FIRST 15 MIN: Performed by: INTERNAL MEDICINE

## 2024-12-23 RX ORDER — SODIUM CHLORIDE 0.9 % (FLUSH) 0.9 %
5-40 SYRINGE (ML) INJECTION PRN
Status: DISCONTINUED | OUTPATIENT
Start: 2024-12-23 | End: 2024-12-23 | Stop reason: HOSPADM

## 2024-12-23 RX ORDER — SODIUM CHLORIDE 9 MG/ML
25 INJECTION, SOLUTION INTRAVENOUS PRN
Status: DISCONTINUED | OUTPATIENT
Start: 2024-12-23 | End: 2024-12-23 | Stop reason: HOSPADM

## 2024-12-23 RX ORDER — PROPOFOL 10 MG/ML
INJECTION, EMULSION INTRAVENOUS
Status: DISCONTINUED | OUTPATIENT
Start: 2024-12-23 | End: 2024-12-23 | Stop reason: SDUPTHER

## 2024-12-23 RX ORDER — SODIUM CHLORIDE 0.9 % (FLUSH) 0.9 %
5-40 SYRINGE (ML) INJECTION EVERY 12 HOURS SCHEDULED
Status: DISCONTINUED | OUTPATIENT
Start: 2024-12-23 | End: 2024-12-23 | Stop reason: HOSPADM

## 2024-12-23 RX ORDER — LIDOCAINE HYDROCHLORIDE 20 MG/ML
INJECTION, SOLUTION EPIDURAL; INFILTRATION; INTRACAUDAL; PERINEURAL
Status: DISCONTINUED | OUTPATIENT
Start: 2024-12-23 | End: 2024-12-23 | Stop reason: SDUPTHER

## 2024-12-23 RX ORDER — SODIUM CHLORIDE 9 MG/ML
INJECTION, SOLUTION INTRAVENOUS PRN
Status: DISCONTINUED | OUTPATIENT
Start: 2024-12-23 | End: 2024-12-23 | Stop reason: HOSPADM

## 2024-12-23 RX ORDER — SODIUM CHLORIDE, SODIUM LACTATE, POTASSIUM CHLORIDE, CALCIUM CHLORIDE 600; 310; 30; 20 MG/100ML; MG/100ML; MG/100ML; MG/100ML
INJECTION, SOLUTION INTRAVENOUS CONTINUOUS
Status: DISCONTINUED | OUTPATIENT
Start: 2024-12-23 | End: 2024-12-23 | Stop reason: HOSPADM

## 2024-12-23 RX ORDER — LIDOCAINE HYDROCHLORIDE 10 MG/ML
1 INJECTION, SOLUTION INFILTRATION; PERINEURAL
Status: DISCONTINUED | OUTPATIENT
Start: 2024-12-23 | End: 2024-12-23 | Stop reason: HOSPADM

## 2024-12-23 RX ADMIN — PROPOFOL 20 MG: 10 INJECTION, EMULSION INTRAVENOUS at 09:32

## 2024-12-23 RX ADMIN — Medication 10 ML: at 09:40

## 2024-12-23 RX ADMIN — PROPOFOL 130 MG: 10 INJECTION, EMULSION INTRAVENOUS at 09:31

## 2024-12-23 RX ADMIN — PROPOFOL 20 MG: 10 INJECTION, EMULSION INTRAVENOUS at 09:38

## 2024-12-23 RX ADMIN — LIDOCAINE HYDROCHLORIDE 80 MG: 20 INJECTION, SOLUTION EPIDURAL; INFILTRATION; INTRACAUDAL; PERINEURAL at 09:31

## 2024-12-23 RX ADMIN — PROPOFOL 180 MCG/KG/MIN: 10 INJECTION, EMULSION INTRAVENOUS at 09:33

## 2024-12-23 ASSESSMENT — PAIN - FUNCTIONAL ASSESSMENT: PAIN_FUNCTIONAL_ASSESSMENT: 0-10

## 2024-12-23 NOTE — PROGRESS NOTES
Colonoscopy;Diverticulosis,   Patient showing no visible signs of pain or distress. Anesthesia provider at bedside,see notes. Report given to GI recovery nurse.

## 2024-12-23 NOTE — DISCHARGE INSTRUCTIONS
Gastrointestinal Colonoscopy/Flexible Sigmoidoscopy - Lower Exam Discharge Instructions    Call Dr. Lemos at [phone] for any problems or questions.    Contact the doctor’s office for follow up appointment as directed.    Medication may cause drowsiness for several hours, therefore:  Do not drive or operate machinery for reminder of the day.  No alcohol today.  Do not make any important or legal decisions for 24 hours.  Do not sign any legal documents for 24 hours.    Ordinarily, you may resume regular diet and activity after exam unless otherwise specified by your physician.    Because of air put into your colon during exam, you may experience some abdominal distension, relieved by the passage of gas, for several hours.    Contact your physician if you have any of the following:  Excessive amount of bleeding - large amount when having a bowel movement.  Occasional specks of blood with bowel movement would not be unusual.  Severe abdominal pain  Fever or Chills

## 2024-12-23 NOTE — ANESTHESIA POSTPROCEDURE EVALUATION
Department of Anesthesiology  Postprocedure Note    Patient: Mera Valles  MRN: [id number]  YOB: 1970  Date of evaluation: 12/23/2024    Procedure Summary       Date: 12/23/24 Room / Location: [facility] ENDO 03 / [facility] ENDOSCOPY    Anesthesia Start: 0926 Anesthesia Stop: 0955    Procedure: COLORECTAL CANCER SCREENING, NOT HIGH RISK Diagnosis:       Encounter for screening colonoscopy      (Encounter for screening colonoscopy [Z12.11])    Surgeons: Safia Lemos MD Responsible Provider: Clement Mays MD    Anesthesia Type: TIVA ASA Status: 2            Anesthesia Type: No value filed.    Tommy Phase I: Tommy Score: 10    Tommy Phase II: Tommy Score: 10    Anesthesia Post Evaluation    Patient location during evaluation: PACU  Patient participation: complete - patient participated  Level of consciousness: awake and alert  Airway patency: patent  Nausea & Vomiting: no nausea and no vomiting  Cardiovascular status: hemodynamically stable  Respiratory status: acceptable, nonlabored ventilation and spontaneous ventilation  Hydration status: euvolemic  Comments: BP (!) 142/78   Pulse 73   Temp 98 °F (36.7 °C) (Temporal)   Resp 16   Ht 1.486 m (4' 10.5\")   Wt 80.3 kg (177 lb)   LMP 11/17/2024 (Approximate)   SpO2 98%   BMI 36.36 kg/m²     Multimodal analgesia pain management approach  Pain management: adequate and satisfactory to patient    No notable events documented.

## 2024-12-23 NOTE — ANESTHESIA PRE PROCEDURE
98.8 °F (37.1 °C)   TempSrc:  Oral   SpO2:  98%   Weight: 80.3 kg (177 lb) 80.3 kg (177 lb)   Height: 1.473 m (4' 10\") 1.486 m (4' 10.5\")                                              BP Readings from Last 3 Encounters:   12/23/24 (!) 174/93   11/04/24 132/88   10/11/24 138/88       NPO Status: Time of last liquid consumption: 0630                        Time of last solid consumption: 2000                        Date of last liquid consumption: 12/23/24                        Date of last solid food consumption: 12/21/24    BMI:   Wt Readings from Last 3 Encounters:   12/23/24 80.3 kg (177 lb)   11/04/24 82.2 kg (181 lb 3.2 oz)   10/11/24 82.1 kg (181 lb)     Body mass index is 36.36 kg/m².    CBC:   Lab Results   Component Value Date/Time    WBC 5.5 11/04/2024 08:54 AM    RBC 4.99 11/04/2024 08:54 AM    HGB 13.2 11/04/2024 08:54 AM    HCT 42.2 11/04/2024 08:54 AM    MCV 84.6 11/04/2024 08:54 AM    RDW 13.8 11/04/2024 08:54 AM     11/04/2024 08:54 AM       CMP:   Lab Results   Component Value Date/Time     11/04/2024 08:54 AM    K 4.6 11/04/2024 08:54 AM     11/04/2024 08:54 AM    CO2 24 11/04/2024 08:54 AM    BUN 13 11/04/2024 08:54 AM    CREATININE 0.73 11/04/2024 08:54 AM    GFRAA 112 02/25/2022 08:34 AM    AGRATIO 1.4 03/17/2021 10:03 AM    LABGLOM >90 11/04/2024 08:54 AM    LABGLOM >60 05/11/2023 10:03 AM    GLUCOSE 97 11/04/2024 08:54 AM    CALCIUM 9.0 11/04/2024 08:54 AM    BILITOT 0.3 11/04/2024 08:54 AM    ALKPHOS 93 11/04/2024 08:54 AM    ALKPHOS 90 03/17/2021 10:03 AM    AST 26 11/04/2024 08:54 AM    ALT 28 11/04/2024 08:54 AM       POC Tests: No results for input(s): \"POCGLU\", \"POCNA\", \"POCK\", \"POCCL\", \"POCBUN\", \"POCHEMO\", \"POCHCT\" in the last 72 hours.    Coags: No results found for: \"PROTIME\", \"INR\", \"APTT\"    HCG (If Applicable): No results found for: \"PREGTESTUR\", \"PREGSERUM\", \"HCG\", \"HCGQUANT\"     ABGs: No results found for: \"PHART\", \"PO2ART\", \"GTH5DPH\", \"IFB7GOW\", \"BEART\",

## 2024-12-23 NOTE — H&P
GASTROENTEROLOGY H&P    Mera Valles is 54 y.o. y/o female here for CRC screening.        Past Medical History:   Diagnosis Date    Anxiety     B12 deficiency 3/9/2015    Esophagitis determined by endoscopy 2016    GERD (gastroesophageal reflux disease) 2015    otc tums prn    Hyperlipidemia LDL goal < 100 3/9/2015    Hypertension     borderline-- per pt never been on meds    Obesity     Palpitations 2015    PAC's    Passive smoker 2015    Psychiatric disorder     anxiety, panic    Vitamin D deficiency 3/9/2015     Past Surgical History:   Procedure Laterality Date     SECTION          CHOLECYSTECTOMY      TUBAL LIGATION       Family History   Problem Relation Age of Onset    Stroke Mother     Elevated Lipids Mother     Hypertension Father     Cancer Maternal Aunt         cervical    Breast Cancer Neg Hx      Social History     Tobacco Use    Smoking status: Never    Smokeless tobacco: Never   Vaping Use    Vaping status: Never Used   Substance Use Topics    Alcohol use: Yes     Alcohol/week: 2.0 standard drinks of alcohol     Comment: occ    Drug use: No         PE:   Vitals:    24 0755   BP: (!) 174/93   Pulse: 66   Resp: 16   Temp: 98.8 °F (37.1 °C)   SpO2: 98%      General:  The patient appears well-nourished, and is in no acute distress.    HEENT:  Normocephalic, atraumatic. No sclerae icterus.   Respiratory: Respiratory effort is normal.     Cardiovascular:  Regular rate and rhythm.     Abdomen:  Soft, non tender to palpation. No distention.     Assessment and Plan:   CRC screening     Proceed with colonoscopy . Further recommendations to follow procedure.       Safia Lemos MD  [facility] Gastroenterology

## 2024-12-31 ENCOUNTER — HOSPITAL ENCOUNTER (OUTPATIENT)
Dept: PHYSICAL THERAPY | Age: 54
Setting detail: RECURRING SERIES
Discharge: HOME | End: 2025-01-03
Attending: INTERNAL MEDICINE
Payer: COMMERCIAL

## 2024-12-31 PROCEDURE — 97110 THERAPEUTIC EXERCISES: CPT

## 2024-12-31 NOTE — THERAPY EVALUATION
of motion to increase independence with ADLs.  Reason For Services/Other Comments:  > Patient continues to demonstrate capacity to improve ROM, strength, and activity tolerance which will increase independence.      Regarding Mera Kvng Valles's therapy, I certify that the treatment plan above will be carried out by a therapist or under their direction.  Thank you for this referral,  Radu Dickerson PT     Referring Physician Signature: Carolyn Cancino MD No Signature is Required for this note.        Charge Capture  Events  Appt Desk  Attendance Report         PA Baseil: Pt tolerating PO, no vomiting. Will dc home with f/u and return precautions. Stable for dc home, pt and family amenable to plan.

## 2025-01-01 ASSESSMENT — ANXIETY QUESTIONNAIRES
2. NOT BEING ABLE TO STOP OR CONTROL WORRYING: SEVERAL DAYS
4. TROUBLE RELAXING: NOT AT ALL
6. BECOMING EASILY ANNOYED OR IRRITABLE: SEVERAL DAYS
2. NOT BEING ABLE TO STOP OR CONTROL WORRYING: SEVERAL DAYS
IF YOU CHECKED OFF ANY PROBLEMS ON THIS QUESTIONNAIRE, HOW DIFFICULT HAVE THESE PROBLEMS MADE IT FOR YOU TO DO YOUR WORK, TAKE CARE OF THINGS AT HOME, OR GET ALONG WITH OTHER PEOPLE: SOMEWHAT DIFFICULT
5. BEING SO RESTLESS THAT IT IS HARD TO SIT STILL: NOT AT ALL
1. FEELING NERVOUS, ANXIOUS, OR ON EDGE: SEVERAL DAYS
3. WORRYING TOO MUCH ABOUT DIFFERENT THINGS: SEVERAL DAYS
IF YOU CHECKED OFF ANY PROBLEMS ON THIS QUESTIONNAIRE, HOW DIFFICULT HAVE THESE PROBLEMS MADE IT FOR YOU TO DO YOUR WORK, TAKE CARE OF THINGS AT HOME, OR GET ALONG WITH OTHER PEOPLE: SOMEWHAT DIFFICULT
5. BEING SO RESTLESS THAT IT IS HARD TO SIT STILL: SEVERAL DAYS
4. TROUBLE RELAXING: SEVERAL DAYS
6. BECOMING EASILY ANNOYED OR IRRITABLE: SEVERAL DAYS
1. FEELING NERVOUS, ANXIOUS, OR ON EDGE: SEVERAL DAYS
7. FEELING AFRAID AS IF SOMETHING AWFUL MIGHT HAPPEN: SEVERAL DAYS
GAD7 TOTAL SCORE: 5
3. WORRYING TOO MUCH ABOUT DIFFERENT THINGS: SEVERAL DAYS
7. FEELING AFRAID AS IF SOMETHING AWFUL MIGHT HAPPEN: SEVERAL DAYS

## 2025-01-01 ASSESSMENT — LIFESTYLE VARIABLES
HISTORY_ALCOHOL_USE: NO
HAVE YOU EVER RECEIVED ALCOHOL OR OTHER DRUG ABUSE TREATMENT: NO
ALCOHOL_DAYS_PER_WEEK: 4
PAST THREE MONTHS WHAT IS THE LARGEST AMOUNT OF ALCOHOLIC DRINKS YOU HAVE CONSUMED IN ONE DAY: 3

## 2025-01-02 ENCOUNTER — OFFICE VISIT (OUTPATIENT)
Dept: BEHAVIORAL/MENTAL HEALTH CLINIC | Age: 55
End: 2025-01-02
Payer: COMMERCIAL

## 2025-01-02 VITALS
DIASTOLIC BLOOD PRESSURE: 88 MMHG | HEIGHT: 59 IN | SYSTOLIC BLOOD PRESSURE: 138 MMHG | OXYGEN SATURATION: 97 % | BODY MASS INDEX: 37.09 KG/M2 | HEART RATE: 57 BPM | TEMPERATURE: 98.7 F | WEIGHT: 184 LBS

## 2025-01-02 DIAGNOSIS — F41.0 GENERALIZED ANXIETY DISORDER WITH PANIC ATTACKS: Primary | ICD-10-CM

## 2025-01-02 DIAGNOSIS — F50.9 EATING DISORDER, UNSPECIFIED TYPE: ICD-10-CM

## 2025-01-02 DIAGNOSIS — F32.A DEPRESSION, UNSPECIFIED DEPRESSION TYPE: ICD-10-CM

## 2025-01-02 DIAGNOSIS — F41.1 GENERALIZED ANXIETY DISORDER WITH PANIC ATTACKS: Primary | ICD-10-CM

## 2025-01-02 PROBLEM — Z12.11 ENCOUNTER FOR SCREENING COLONOSCOPY: Status: RESOLVED | Noted: 2024-10-29 | Resolved: 2025-01-02

## 2025-01-02 PROCEDURE — 90792 PSYCH DIAG EVAL W/MED SRVCS: CPT | Performed by: PSYCHIATRY & NEUROLOGY

## 2025-01-02 RX ORDER — ALPRAZOLAM 0.25 MG/1
0.25 TABLET ORAL 3 TIMES DAILY PRN
Qty: 30 TABLET | Refills: 1 | Status: SHIPPED | OUTPATIENT
Start: 2025-01-02 | End: 2025-02-01

## 2025-01-02 ASSESSMENT — PATIENT HEALTH QUESTIONNAIRE - PHQ9
6. FEELING BAD ABOUT YOURSELF - OR THAT YOU ARE A FAILURE OR HAVE LET YOURSELF OR YOUR FAMILY DOWN: SEVERAL DAYS
2. FEELING DOWN, DEPRESSED OR HOPELESS: NOT AT ALL
SUM OF ALL RESPONSES TO PHQ QUESTIONS 1-9: 6
SUM OF ALL RESPONSES TO PHQ QUESTIONS 1-9: 6
4. FEELING TIRED OR HAVING LITTLE ENERGY: SEVERAL DAYS
1. LITTLE INTEREST OR PLEASURE IN DOING THINGS: NOT AT ALL
5. POOR APPETITE OR OVEREATING: SEVERAL DAYS
SUM OF ALL RESPONSES TO PHQ QUESTIONS 1-9: 6
9. THOUGHTS THAT YOU WOULD BE BETTER OFF DEAD, OR OF HURTING YOURSELF: NOT AT ALL
8. MOVING OR SPEAKING SO SLOWLY THAT OTHER PEOPLE COULD HAVE NOTICED. OR THE OPPOSITE, BEING SO FIGETY OR RESTLESS THAT YOU HAVE BEEN MOVING AROUND A LOT MORE THAN USUAL: SEVERAL DAYS
SUM OF ALL RESPONSES TO PHQ9 QUESTIONS 1 & 2: 0
7. TROUBLE CONCENTRATING ON THINGS, SUCH AS READING THE NEWSPAPER OR WATCHING TELEVISION: SEVERAL DAYS
3. TROUBLE FALLING OR STAYING ASLEEP: SEVERAL DAYS
SUM OF ALL RESPONSES TO PHQ QUESTIONS 1-9: 6
10. IF YOU CHECKED OFF ANY PROBLEMS, HOW DIFFICULT HAVE THESE PROBLEMS MADE IT FOR YOU TO DO YOUR WORK, TAKE CARE OF THINGS AT HOME, OR GET ALONG WITH OTHER PEOPLE: SOMEWHAT DIFFICULT

## 2025-01-02 ASSESSMENT — COLUMBIA-SUICIDE SEVERITY RATING SCALE - C-SSRS
2. HAVE YOU ACTUALLY HAD ANY THOUGHTS OF KILLING YOURSELF?: NO
1. WITHIN THE PAST MONTH, HAVE YOU WISHED YOU WERE DEAD OR WISHED YOU COULD GO TO SLEEP AND NOT WAKE UP?: NO
6. HAVE YOU EVER DONE ANYTHING, STARTED TO DO ANYTHING, OR PREPARED TO DO ANYTHING TO END YOUR LIFE?: NO

## 2025-01-02 NOTE — PROGRESS NOTES
UNABLE TO ASSESS AT THIS TIME   [x] AVERAGE   [] ABOVE AVERAGE   [] BELOW AVERAGE      [] GOOD TO EXCELLENT KNOWLEDGE OF CURRENT EVENTS   [] POOR TO NO KNLEDGE OF CURRENT EVENTS                                          ABNORMAL MOVEMENTS:   [x] NONE   [] TICS   [] TREMORS   [] BIZZARE      [] FACE   [] TRUNK   [] EXTREMETIES   [] GESTURES        SLEEP:   [] GOOD   [x] FAIR   [] POOR      APPETITE:   [] GOOD   [] POOR   [x] ERRATIC       MUSCLE STRENGTH AND TONE   [x] WNL   [] ATROPHY   [] SPASTIC        [] FLACCID   [] COGWHEEL       Diagnoses/Impressions:    ICD-10-CM    1. Generalized anxiety disorder with panic attacks  F41.1 ALPRAZolam (XANAX) 0.25 MG tablet    F41.0       2. Depression, unspecified depression type  F32.A       3. Eating disorder, unspecified type  F50.9           TREATMENT GOALS:  Symptom reduction, Medication adherence, maintain therapeutic gains    LABS/IMAGING:    []  Ordered [x]  Reviewed []  New Labs Ordered:     LAB  WBC   Date/Time Value Ref Range Status   11/04/2024 08:54 AM 5.5 4.3 - 11.1 K/uL Final     Hemoglobin   Date/Time Value Ref Range Status   11/04/2024 08:54 AM 13.2 11.7 - 15.4 g/dL Final     Hematocrit   Date/Time Value Ref Range Status   11/04/2024 08:54 AM 42.2 35.8 - 46.3 % Final     Platelets   Date/Time Value Ref Range Status   11/04/2024 08:54  150 - 450 K/uL Final     Sodium   Date/Time Value Ref Range Status   11/04/2024 08:54  136 - 145 mmol/L Final     Potassium   Date/Time Value Ref Range Status   11/04/2024 08:54 AM 4.6 3.5 - 5.1 mmol/L Final     Comment:     Specimen hemolysis has exceeded the interference as defined by Roche. Value may be falsely increased.     Chloride   Date/Time Value Ref Range Status   11/04/2024 08:54  98 - 107 mmol/L Final     CO2   Date/Time Value Ref Range Status   11/04/2024 08:54 AM 24 20 - 29 mmol/L Final     BUN   Date/Time Value Ref Range Status   11/04/2024 08:54 AM 13 6 - 23 MG/DL Final     ALT   Date/Time Value

## 2025-01-06 ENCOUNTER — TELEPHONE (OUTPATIENT)
Age: 55
End: 2025-01-06

## 2025-03-04 ENCOUNTER — OFFICE VISIT (OUTPATIENT)
Dept: PRIMARY CARE CLINIC | Facility: CLINIC | Age: 55
End: 2025-03-04
Payer: COMMERCIAL

## 2025-03-04 VITALS
WEIGHT: 185.6 LBS | HEIGHT: 59 IN | BODY MASS INDEX: 37.42 KG/M2 | SYSTOLIC BLOOD PRESSURE: 132 MMHG | OXYGEN SATURATION: 98 % | HEART RATE: 73 BPM | DIASTOLIC BLOOD PRESSURE: 88 MMHG

## 2025-03-04 DIAGNOSIS — R73.9 ELEVATED BLOOD SUGAR: ICD-10-CM

## 2025-03-04 DIAGNOSIS — Z00.00 ANNUAL PHYSICAL EXAM: ICD-10-CM

## 2025-03-04 DIAGNOSIS — R13.10 DYSPHAGIA, UNSPECIFIED TYPE: ICD-10-CM

## 2025-03-04 DIAGNOSIS — R73.9 ELEVATED BLOOD SUGAR: Primary | ICD-10-CM

## 2025-03-04 LAB
ALBUMIN SERPL-MCNC: 3.7 G/DL (ref 3.5–5)
ALBUMIN/GLOB SERPL: 1.1 (ref 1–1.9)
ALP SERPL-CCNC: 115 U/L (ref 35–104)
ALT SERPL-CCNC: 43 U/L (ref 8–45)
ANION GAP SERPL CALC-SCNC: 12 MMOL/L (ref 7–16)
APPEARANCE UR: ABNORMAL
AST SERPL-CCNC: 25 U/L (ref 15–37)
BACTERIA URNS QL MICRO: ABNORMAL /HPF
BASOPHILS # BLD: 0.04 K/UL (ref 0–0.2)
BASOPHILS NFR BLD: 0.7 % (ref 0–2)
BILIRUB SERPL-MCNC: <0.2 MG/DL (ref 0–1.2)
BILIRUB UR QL: NEGATIVE
BUN SERPL-MCNC: 13 MG/DL (ref 6–23)
CALCIUM SERPL-MCNC: 9.4 MG/DL (ref 8.8–10.2)
CASTS URNS QL MICRO: 0 /LPF
CHLORIDE SERPL-SCNC: 105 MMOL/L (ref 98–107)
CHOLEST SERPL-MCNC: 155 MG/DL (ref 0–200)
CO2 SERPL-SCNC: 25 MMOL/L (ref 20–29)
COLOR UR: ABNORMAL
CREAT SERPL-MCNC: 0.75 MG/DL (ref 0.6–1.1)
CRYSTALS URNS QL MICRO: 0 /LPF
DIFFERENTIAL METHOD BLD: ABNORMAL
EOSINOPHIL # BLD: 0.07 K/UL (ref 0–0.8)
EOSINOPHIL NFR BLD: 1.3 % (ref 0.5–7.8)
EPI CELLS #/AREA URNS HPF: ABNORMAL /HPF (ref 0–5)
ERYTHROCYTE [DISTWIDTH] IN BLOOD BY AUTOMATED COUNT: 14 % (ref 11.9–14.6)
EST. AVERAGE GLUCOSE BLD GHB EST-MCNC: 120 MG/DL
GLOBULIN SER CALC-MCNC: 3.5 G/DL (ref 2.3–3.5)
GLUCOSE SERPL-MCNC: 99 MG/DL (ref 70–99)
GLUCOSE UR STRIP.AUTO-MCNC: NEGATIVE MG/DL
HBA1C MFR BLD: 5.8 % (ref 0–5.6)
HCT VFR BLD AUTO: 41.2 % (ref 35.8–46.3)
HDLC SERPL-MCNC: 48 MG/DL (ref 40–60)
HDLC SERPL: 3.2 (ref 0–5)
HGB BLD-MCNC: 13.3 G/DL (ref 11.7–15.4)
HGB UR QL STRIP: NEGATIVE
HYALINE CASTS URNS QL MICRO: ABNORMAL /LPF
IMM GRANULOCYTES # BLD AUTO: 0.01 K/UL (ref 0–0.5)
IMM GRANULOCYTES NFR BLD AUTO: 0.2 % (ref 0–5)
KETONES UR QL STRIP.AUTO: NEGATIVE MG/DL
LDLC SERPL CALC-MCNC: 79 MG/DL (ref 0–100)
LEUKOCYTE ESTERASE UR QL STRIP.AUTO: ABNORMAL
LYMPHOCYTES # BLD: 1.9 K/UL (ref 0.5–4.6)
LYMPHOCYTES NFR BLD: 34.4 % (ref 13–44)
MCH RBC QN AUTO: 26.4 PG (ref 26.1–32.9)
MCHC RBC AUTO-ENTMCNC: 32.3 G/DL (ref 31.4–35)
MCV RBC AUTO: 81.7 FL (ref 82–102)
MONOCYTES # BLD: 0.48 K/UL (ref 0.1–1.3)
MONOCYTES NFR BLD: 8.7 % (ref 4–12)
MUCOUS THREADS URNS QL MICRO: 0 /LPF
NEUTS SEG # BLD: 3.03 K/UL (ref 1.7–8.2)
NEUTS SEG NFR BLD: 54.7 % (ref 43–78)
NITRITE UR QL STRIP.AUTO: NEGATIVE
NRBC # BLD: 0 K/UL (ref 0–0.2)
PH UR STRIP: 5.5 (ref 5–9)
PLATELET # BLD AUTO: 265 K/UL (ref 150–450)
PMV BLD AUTO: 11.4 FL (ref 9.4–12.3)
POTASSIUM SERPL-SCNC: 4.3 MMOL/L (ref 3.5–5.1)
PROT SERPL-MCNC: 7.2 G/DL (ref 6.3–8.2)
PROT UR STRIP-MCNC: NEGATIVE MG/DL
RBC # BLD AUTO: 5.04 M/UL (ref 4.05–5.2)
RBC #/AREA URNS HPF: ABNORMAL /HPF (ref 0–5)
SODIUM SERPL-SCNC: 141 MMOL/L (ref 136–145)
SP GR UR REFRACTOMETRY: 1.02 (ref 1–1.02)
TRIGL SERPL-MCNC: 140 MG/DL (ref 0–150)
TSH, 3RD GENERATION: 1.68 UIU/ML (ref 0.27–4.2)
URINE CULTURE IF INDICATED: ABNORMAL
UROBILINOGEN UR QL STRIP.AUTO: 0.2 EU/DL (ref 0.2–1)
VLDLC SERPL CALC-MCNC: 28 MG/DL (ref 6–23)
WBC # BLD AUTO: 5.5 K/UL (ref 4.3–11.1)
WBC URNS QL MICRO: ABNORMAL /HPF (ref 0–4)

## 2025-03-04 PROCEDURE — 99213 OFFICE O/P EST LOW 20 MIN: CPT | Performed by: INTERNAL MEDICINE

## 2025-03-04 PROCEDURE — 99396 PREV VISIT EST AGE 40-64: CPT | Performed by: INTERNAL MEDICINE

## 2025-03-04 SDOH — ECONOMIC STABILITY: FOOD INSECURITY: WITHIN THE PAST 12 MONTHS, THE FOOD YOU BOUGHT JUST DIDN'T LAST AND YOU DIDN'T HAVE MONEY TO GET MORE.: NEVER TRUE

## 2025-03-04 SDOH — ECONOMIC STABILITY: FOOD INSECURITY: WITHIN THE PAST 12 MONTHS, YOU WORRIED THAT YOUR FOOD WOULD RUN OUT BEFORE YOU GOT MONEY TO BUY MORE.: NEVER TRUE

## 2025-03-04 ASSESSMENT — ENCOUNTER SYMPTOMS
RECTAL PAIN: 0
RESPIRATORY NEGATIVE: 1
ANAL BLEEDING: 0
EYE REDNESS: 0
COUGH: 0
ALLERGIC/IMMUNOLOGIC NEGATIVE: 1
GASTROINTESTINAL NEGATIVE: 1
RHINORRHEA: 0
ABDOMINAL PAIN: 0
BLOOD IN STOOL: 0
BACK PAIN: 0
CHOKING: 0
COLOR CHANGE: 0
CONSTIPATION: 0
ABDOMINAL DISTENTION: 0
STRIDOR: 0
EYE PAIN: 0
EYE DISCHARGE: 0
EYES NEGATIVE: 1
EYE ITCHING: 0
WHEEZING: 0
NAUSEA: 0
DIARRHEA: 0
SHORTNESS OF BREATH: 0
CHEST TIGHTNESS: 0
SINUS PRESSURE: 0

## 2025-03-04 NOTE — PROGRESS NOTES
FOLLOW UP VISIT    Subjective:    Mera Valles (: 1970) is a 54 y.o., female,   Chief Complaint   Patient presents with    Annual Exam     Fasting   Denies s/sx of UIT       HPI:  HPI  History of Present Illness  The patient presents for a physical exam.    She reports experiencing difficulty swallowing, particularly at night and has noticed this symptom while consuming steak. She has a history of similar symptoms several years ago, for which she consulted a gastroenterologist and underwent an endoscopy.    She is up to date on her mammogram and colonoscopy. She has completed physical therapy as previously recommended.    FAMILY HISTORY  Her father has a similar condition where food sometimes gets stuck.       The following portions of the patient's history were reviewed and updated as appropriate:      Past Medical History:   Diagnosis Date    Anxiety     B12 deficiency 3/9/2015    Esophagitis determined by endoscopy 2016    GERD (gastroesophageal reflux disease) 2015    otc tums prn    Hyperlipidemia LDL goal < 100 3/9/2015    Hypertension     borderline-- per pt never been on meds    Obesity     Palpitations 2015    PAC's    Passive smoker 2015    Psychiatric disorder     anxiety, panic    Vitamin D deficiency 3/9/2015       Past Surgical History:   Procedure Laterality Date     SECTION          CHOLECYSTECTOMY      COLONOSCOPY N/A 2024    COLORECTAL CANCER SCREENING, NOT HIGH RISK performed by Safia Lemos MD at [facility] ENDOSCOPY    TUBAL LIGATION         Family History   Problem Relation Age of Onset    Stroke Mother     Elevated Lipids Mother     Hypertension Father     Anxiety Disorder Father     Cancer Maternal Aunt         cervical    Breast Cancer Neg Hx        Social History     Socioeconomic History    Marital status:      Spouse name: Not on file    Number of children: Not on file    Years of education: Not on file    Highest

## 2025-03-25 ASSESSMENT — PATIENT HEALTH QUESTIONNAIRE - PHQ9
SUM OF ALL RESPONSES TO PHQ QUESTIONS 1-9: 5
7. TROUBLE CONCENTRATING ON THINGS, SUCH AS READING THE NEWSPAPER OR WATCHING TELEVISION: SEVERAL DAYS
10. IF YOU CHECKED OFF ANY PROBLEMS, HOW DIFFICULT HAVE THESE PROBLEMS MADE IT FOR YOU TO DO YOUR WORK, TAKE CARE OF THINGS AT HOME, OR GET ALONG WITH OTHER PEOPLE: NOT DIFFICULT AT ALL
7. TROUBLE CONCENTRATING ON THINGS, SUCH AS READING THE NEWSPAPER OR WATCHING TELEVISION: SEVERAL DAYS
1. LITTLE INTEREST OR PLEASURE IN DOING THINGS: SEVERAL DAYS
SUM OF ALL RESPONSES TO PHQ QUESTIONS 1-9: 5
SUM OF ALL RESPONSES TO PHQ QUESTIONS 1-9: 5
2. FEELING DOWN, DEPRESSED OR HOPELESS: NOT AT ALL
8. MOVING OR SPEAKING SO SLOWLY THAT OTHER PEOPLE COULD HAVE NOTICED. OR THE OPPOSITE, BEING SO FIGETY OR RESTLESS THAT YOU HAVE BEEN MOVING AROUND A LOT MORE THAN USUAL: NOT AT ALL
4. FEELING TIRED OR HAVING LITTLE ENERGY: SEVERAL DAYS
1. LITTLE INTEREST OR PLEASURE IN DOING THINGS: SEVERAL DAYS
3. TROUBLE FALLING OR STAYING ASLEEP: SEVERAL DAYS
2. FEELING DOWN, DEPRESSED OR HOPELESS: NOT AT ALL
8. MOVING OR SPEAKING SO SLOWLY THAT OTHER PEOPLE COULD HAVE NOTICED. OR THE OPPOSITE - BEING SO FIDGETY OR RESTLESS THAT YOU HAVE BEEN MOVING AROUND A LOT MORE THAN USUAL: NOT AT ALL
SUM OF ALL RESPONSES TO PHQ QUESTIONS 1-9: 5
SUM OF ALL RESPONSES TO PHQ QUESTIONS 1-9: 5
5. POOR APPETITE OR OVEREATING: NOT AT ALL
10. IF YOU CHECKED OFF ANY PROBLEMS, HOW DIFFICULT HAVE THESE PROBLEMS MADE IT FOR YOU TO DO YOUR WORK, TAKE CARE OF THINGS AT HOME, OR GET ALONG WITH OTHER PEOPLE: NOT DIFFICULT AT ALL
6. FEELING BAD ABOUT YOURSELF - OR THAT YOU ARE A FAILURE OR HAVE LET YOURSELF OR YOUR FAMILY DOWN: SEVERAL DAYS
3. TROUBLE FALLING OR STAYING ASLEEP: SEVERAL DAYS
9. THOUGHTS THAT YOU WOULD BE BETTER OFF DEAD, OR OF HURTING YOURSELF: NOT AT ALL
6. FEELING BAD ABOUT YOURSELF - OR THAT YOU ARE A FAILURE OR HAVE LET YOURSELF OR YOUR FAMILY DOWN: SEVERAL DAYS
9. THOUGHTS THAT YOU WOULD BE BETTER OFF DEAD, OR OF HURTING YOURSELF: NOT AT ALL
5. POOR APPETITE OR OVEREATING: NOT AT ALL
4. FEELING TIRED OR HAVING LITTLE ENERGY: SEVERAL DAYS

## 2025-03-25 ASSESSMENT — ANXIETY QUESTIONNAIRES
2. NOT BEING ABLE TO STOP OR CONTROL WORRYING: SEVERAL DAYS
GAD7 TOTAL SCORE: 6
1. FEELING NERVOUS, ANXIOUS, OR ON EDGE: SEVERAL DAYS
6. BECOMING EASILY ANNOYED OR IRRITABLE: SEVERAL DAYS
2. NOT BEING ABLE TO STOP OR CONTROL WORRYING: SEVERAL DAYS
7. FEELING AFRAID AS IF SOMETHING AWFUL MIGHT HAPPEN: SEVERAL DAYS
IF YOU CHECKED OFF ANY PROBLEMS ON THIS QUESTIONNAIRE, HOW DIFFICULT HAVE THESE PROBLEMS MADE IT FOR YOU TO DO YOUR WORK, TAKE CARE OF THINGS AT HOME, OR GET ALONG WITH OTHER PEOPLE: SOMEWHAT DIFFICULT
3. WORRYING TOO MUCH ABOUT DIFFERENT THINGS: SEVERAL DAYS
5. BEING SO RESTLESS THAT IT IS HARD TO SIT STILL: NOT AT ALL
IF YOU CHECKED OFF ANY PROBLEMS ON THIS QUESTIONNAIRE, HOW DIFFICULT HAVE THESE PROBLEMS MADE IT FOR YOU TO DO YOUR WORK, TAKE CARE OF THINGS AT HOME, OR GET ALONG WITH OTHER PEOPLE: SOMEWHAT DIFFICULT
1. FEELING NERVOUS, ANXIOUS, OR ON EDGE: SEVERAL DAYS
4. TROUBLE RELAXING: SEVERAL DAYS
6. BECOMING EASILY ANNOYED OR IRRITABLE: SEVERAL DAYS
3. WORRYING TOO MUCH ABOUT DIFFERENT THINGS: SEVERAL DAYS
4. TROUBLE RELAXING: SEVERAL DAYS
5. BEING SO RESTLESS THAT IT IS HARD TO SIT STILL: NOT AT ALL
7. FEELING AFRAID AS IF SOMETHING AWFUL MIGHT HAPPEN: SEVERAL DAYS

## 2025-03-26 ENCOUNTER — TELEMEDICINE (OUTPATIENT)
Dept: BEHAVIORAL/MENTAL HEALTH CLINIC | Age: 55
End: 2025-03-26
Payer: COMMERCIAL

## 2025-03-26 DIAGNOSIS — F41.1 GENERALIZED ANXIETY DISORDER WITH PANIC ATTACKS: Primary | ICD-10-CM

## 2025-03-26 DIAGNOSIS — F32.A DEPRESSION, UNSPECIFIED DEPRESSION TYPE: ICD-10-CM

## 2025-03-26 DIAGNOSIS — F41.0 GENERALIZED ANXIETY DISORDER WITH PANIC ATTACKS: Primary | ICD-10-CM

## 2025-03-26 DIAGNOSIS — F50.9 EATING DISORDER, UNSPECIFIED TYPE: ICD-10-CM

## 2025-03-26 PROCEDURE — 99213 OFFICE O/P EST LOW 20 MIN: CPT | Performed by: PSYCHIATRY & NEUROLOGY

## 2025-03-26 RX ORDER — ALPRAZOLAM 0.25 MG
0.25 TABLET ORAL DAILY PRN
Qty: 30 TABLET | Refills: 1 | Status: SHIPPED | OUTPATIENT
Start: 2025-03-26 | End: 2025-06-24

## 2025-03-26 RX ORDER — ALPRAZOLAM 0.25 MG
0.25 TABLET ORAL 3 TIMES DAILY PRN
COMMUNITY
End: 2025-03-26 | Stop reason: SDUPTHER

## 2025-03-26 NOTE — PROGRESS NOTES
Patient:  Mera Valles  Age:  54 y.o.  :  1970     SEX:  female MRN:  [id number]     RACE: White (non-)     SEEN:  [x]  PATIENT  []  SPOUSE []  OTHER:                  3/25/2025     7:56 PM 2025     8:31 AM 2024     8:35 AM   PHQ-9    Little interest or pleasure in doing things 1 0 0   Feeling down, depressed, or hopeless 0 0 1   Trouble falling or staying asleep, or sleeping too much 1 1 0   Feeling tired or having little energy 1 1 1   Poor appetite or overeating 0 1 1   Feeling bad about yourself - or that you are a failure or have let yourself or your family down 1 1 1   Trouble concentrating on things, such as reading the newspaper or watching television 1 1 1   Moving or speaking so slowly that other people could have noticed. Or the opposite - being so fidgety or restless that you have been moving around a lot more than usual 0 1 1   Thoughts that you would be better off dead, or of hurting yourself in some way 0 0 0   PHQ-2 Score 1  0 1   PHQ-9 Total Score 5  6 6   If you checked off any problems, how difficult have these problems made it for you to do your work, take care of things at home, or get along with other people? 0 1 1       Patient-reported           3/25/2025     7:55 PM 2025     1:02 PM   ELICEO-7 SCREENING   Feeling nervous, anxious, or on edge Several days Several days   Not being able to stop or control worrying Several days Several days   Worrying too much about different things Several days Several days   Trouble relaxing Several days Not at all   Being so restless that it is hard to sit still Not at all Not at all   Becoming easily annoyed or irritable Several days Several days   Feeling afraid as if something awful might happen Several days Several days   ELICEO-7 Total Score 6  5   How difficult have these problems made it for you to do your work, take care of things at home, or get along with other people? Somewhat difficult Somewhat

## 2025-04-18 ENCOUNTER — OFFICE VISIT (OUTPATIENT)
Age: 55
End: 2025-04-18
Payer: COMMERCIAL

## 2025-04-18 ENCOUNTER — PREP FOR PROCEDURE (OUTPATIENT)
Age: 55
End: 2025-04-18

## 2025-04-18 ENCOUNTER — TELEPHONE (OUTPATIENT)
Age: 55
End: 2025-04-18

## 2025-04-18 VITALS
BODY MASS INDEX: 37.7 KG/M2 | RESPIRATION RATE: 12 BRPM | WEIGHT: 179.6 LBS | SYSTOLIC BLOOD PRESSURE: 138 MMHG | HEART RATE: 69 BPM | HEIGHT: 58 IN | OXYGEN SATURATION: 96 % | TEMPERATURE: 98 F | DIASTOLIC BLOOD PRESSURE: 93 MMHG

## 2025-04-18 DIAGNOSIS — K21.9 GASTROESOPHAGEAL REFLUX DISEASE, UNSPECIFIED WHETHER ESOPHAGITIS PRESENT: ICD-10-CM

## 2025-04-18 DIAGNOSIS — R13.19 ESOPHAGEAL DYSPHAGIA: Primary | ICD-10-CM

## 2025-04-18 DIAGNOSIS — R13.19 ESOPHAGEAL DYSPHAGIA: ICD-10-CM

## 2025-04-18 PROCEDURE — 99213 OFFICE O/P EST LOW 20 MIN: CPT | Performed by: PHYSICIAN ASSISTANT

## 2025-04-18 RX ORDER — OMEPRAZOLE 20 MG/1
20 CAPSULE, DELAYED RELEASE ORAL
Qty: 180 CAPSULE | Refills: 0 | Status: SHIPPED | OUTPATIENT
Start: 2025-04-18

## 2025-04-18 RX ORDER — SODIUM CHLORIDE 9 MG/ML
25 INJECTION, SOLUTION INTRAVENOUS PRN
OUTPATIENT
Start: 2025-04-18

## 2025-04-18 RX ORDER — SODIUM CHLORIDE 0.9 % (FLUSH) 0.9 %
5-40 SYRINGE (ML) INJECTION EVERY 12 HOURS SCHEDULED
OUTPATIENT
Start: 2025-04-18

## 2025-04-18 RX ORDER — SODIUM CHLORIDE 0.9 % (FLUSH) 0.9 %
5-40 SYRINGE (ML) INJECTION PRN
OUTPATIENT
Start: 2025-04-18

## 2025-04-18 NOTE — TELEPHONE ENCOUNTER
Patient in office, scheduled for EGD with dilation with Dr. Lemos on Tuesday 7/1/2025 at [facility]. Patient to hold vitamins 5 days prior. Instructions given to the patient in office.      The day before your test->     You should NOT eat or drink after midnight.

## 2025-04-18 NOTE — PROGRESS NOTES
Mera Valles (:  1970) is a 54 y.o. female established patient following up today for the chief complaint(s):  New Patient (Dysphagia, unspecified type, patient states she has a little throat tightness/)        Assessment & Plan   ASSESSMENT/PLAN:  1. Esophageal dysphagia  2. Gastroesophageal reflux disease, unspecified whether esophagitis present  -     omeprazole (PRILOSEC) 20 MG delayed release capsule; Take 1 capsule by mouth 2 times daily (before meals), Disp-180 capsule, R-0Normal      Assessment & Plan  1. Dysphagia: Chronic.  - Schedule EGD/dilation.    2. GERD: Chronic.  - Prescribe omeprazole 20 mg before breakfast and dinner.   - Provide dietary and lifestyle recommendations. GERD handout provided.    3. Anxiety.  - Discussed anxiety's potential impact on swallowing.  - Discuss treatment options with PCP.    Follow-up  - Follow-up in 3-4 weeks post-procedure.         Subjective   SUBJECTIVE/OBJECTIVE  Mera Valles is a 54 y.o. year old female with PMH pertinent for HPL, GERD, PAC, vitamin D deficiency, anxiety, depression. Surgical history is pertinent for cholecystectomy, , tubal ligation.     Pertinent evaluation to-date includes:     -EGD 2016 (Dr. Hilario): mild reflux-induced esophagitis without hiatal hernia, stomach and duodenum were normal; duodenal biopsies with no abnormalities, gastric biopsies showed reparative changes, negative H.pylori  - Colonoscopy 2024 (Dr. Lemos): Good prep, few left colon diverticula, otherwise normal colonoscopy; recall in place 10 years    Patient underwent direct schedule colonoscopy in December as noted above.  Patient was referred back for evaluation of dysphagia.  Referral note reviewed from 3/4/2025.    History of Present Illness  54-year-old female presents with dysphagia, GERD, anxiety, and constipation.    Dysphagia: Difficulty swallowing solids, especially in the evening and with large pills. No issues with

## 2025-04-19 NOTE — PROGRESS NOTES
[facility] CARDIOLOGY Follow Up                 Reason for Visit: Cardiac risk assessment prior to noncardiac surgery    Subjective:     Patient is a 54 y.o. female with a PMH of hyperlipidemia who presents for follow-up.  The patient was last seen in 2024.  She was seen for preoperative evaluation.  She underwent a colonoscopy in 2024.  She is scheduled for an EGD in 2025.  She had an CLEMENCIA in 2023 that was noted to demonstrate a normal EF and was noted to be negative for myocardial ischemia. Her exercise capacity was above average, and she experienced no angina during the stress test.  She denies angina.  Decreasing alcohol and caffeine intake has helped with her palpitations.      Past Medical History:   Diagnosis Date    Anxiety     B12 deficiency 3/9/2015    Esophagitis determined by endoscopy 2016    GERD (gastroesophageal reflux disease) 2015    otc tums prn    Hyperlipidemia LDL goal < 100 3/9/2015    Hypertension     borderline-- per pt never been on meds    Obesity     Palpitations 2015    PAC's    Passive smoker 2015    Psychiatric disorder     anxiety, panic    Vitamin D deficiency 3/9/2015      Past Surgical History:   Procedure Laterality Date     SECTION          CHOLECYSTECTOMY      COLONOSCOPY N/A 2024    COLORECTAL CANCER SCREENING, NOT HIGH RISK performed by Safia Lemos MD at [facility] ENDOSCOPY    TUBAL LIGATION        Family History   Problem Relation Age of Onset    Stroke Mother     Elevated Lipids Mother     Hypertension Father     Anxiety Disorder Father     Cancer Maternal Aunt         cervical    Breast Cancer Neg Hx       Social History     Tobacco Use    Smoking status: Never    Smokeless tobacco: Never   Substance Use Topics    Alcohol use: Yes     Alcohol/week: 3.0 standard drinks of alcohol     Types: 3 Glasses of wine per week     Comment: occ      Allergies   Allergen Reactions    Tree Nut [Macadamia Nut Oil]

## 2025-04-22 ENCOUNTER — OFFICE VISIT (OUTPATIENT)
Age: 55
End: 2025-04-22
Payer: COMMERCIAL

## 2025-04-22 VITALS
BODY MASS INDEX: 37.79 KG/M2 | SYSTOLIC BLOOD PRESSURE: 120 MMHG | HEART RATE: 72 BPM | DIASTOLIC BLOOD PRESSURE: 80 MMHG | HEIGHT: 58 IN | WEIGHT: 180 LBS

## 2025-04-22 DIAGNOSIS — Z01.810 PREOPERATIVE CARDIOVASCULAR EXAMINATION: ICD-10-CM

## 2025-04-22 DIAGNOSIS — E78.5 HYPERLIPIDEMIA, UNSPECIFIED HYPERLIPIDEMIA TYPE: Primary | ICD-10-CM

## 2025-04-22 PROCEDURE — 99213 OFFICE O/P EST LOW 20 MIN: CPT | Performed by: INTERNAL MEDICINE

## 2025-05-22 PROBLEM — Z01.810 PREOPERATIVE CARDIOVASCULAR EXAMINATION: Status: RESOLVED | Noted: 2024-10-11 | Resolved: 2025-05-22

## 2025-06-12 NOTE — PERIOP NOTE
Patient verified name, , and procedure.    Type: 1a; abbreviated assessment per anesthesia guidelines  Labs per surgeon: None  Labs per anesthesia: None      Instructed pt that they will be notified by the Gi Lab for time of arrival. If any questions please call the GI lab at [phone].    Follow diet and prep instructions per office. Nothing to eat or drink after midnight.     Bath or shower the night before and the am of surgery with antibacterial soap. No lotions, oils, powders, cologne on skin. No make up, eye make up or jewelry. Wear loose fitting comfortable, clean clothing.     Must have adult present in building the entire time .     Medications for the day of procedure Omeprazole    The following discharge instructions reviewed with patient: medication given during procedure may cause drowsiness for several hours, therefore, do not drive or operate machinery for remainder of the day, no alcohol on the day of your procedure, resume regular diet and activity unless otherwise directed, for mild sore throat you may use Cepacol throat lozenges or warm salt water gargles as needed, call your physician for any problems or questions. Patient verbalizes understanding.

## 2025-06-17 ENCOUNTER — TELEMEDICINE (OUTPATIENT)
Dept: BEHAVIORAL/MENTAL HEALTH CLINIC | Age: 55
End: 2025-06-17
Payer: COMMERCIAL

## 2025-06-17 DIAGNOSIS — F50.9 EATING DISORDER, UNSPECIFIED TYPE: ICD-10-CM

## 2025-06-17 DIAGNOSIS — F41.0 GENERALIZED ANXIETY DISORDER WITH PANIC ATTACKS: Primary | Chronic | ICD-10-CM

## 2025-06-17 DIAGNOSIS — F32.A DEPRESSION, UNSPECIFIED DEPRESSION TYPE: ICD-10-CM

## 2025-06-17 DIAGNOSIS — F41.1 GENERALIZED ANXIETY DISORDER WITH PANIC ATTACKS: Primary | Chronic | ICD-10-CM

## 2025-06-17 PROCEDURE — 99214 OFFICE O/P EST MOD 30 MIN: CPT | Performed by: PSYCHIATRY & NEUROLOGY

## 2025-06-17 RX ORDER — ALPRAZOLAM 0.25 MG
0.25 TABLET ORAL DAILY PRN
Qty: 30 TABLET | Refills: 1 | Status: SHIPPED | OUTPATIENT
Start: 2025-06-17 | End: 2025-09-15

## 2025-06-17 ASSESSMENT — ANXIETY QUESTIONNAIRES
2. NOT BEING ABLE TO STOP OR CONTROL WORRYING: SEVERAL DAYS
IF YOU CHECKED OFF ANY PROBLEMS ON THIS QUESTIONNAIRE, HOW DIFFICULT HAVE THESE PROBLEMS MADE IT FOR YOU TO DO YOUR WORK, TAKE CARE OF THINGS AT HOME, OR GET ALONG WITH OTHER PEOPLE: SOMEWHAT DIFFICULT
6. BECOMING EASILY ANNOYED OR IRRITABLE: SEVERAL DAYS
GAD7 TOTAL SCORE: 8
7. FEELING AFRAID AS IF SOMETHING AWFUL MIGHT HAPPEN: MORE THAN HALF THE DAYS
6. BECOMING EASILY ANNOYED OR IRRITABLE: SEVERAL DAYS
3. WORRYING TOO MUCH ABOUT DIFFERENT THINGS: MORE THAN HALF THE DAYS
4. TROUBLE RELAXING: SEVERAL DAYS
2. NOT BEING ABLE TO STOP OR CONTROL WORRYING: SEVERAL DAYS
5. BEING SO RESTLESS THAT IT IS HARD TO SIT STILL: NOT AT ALL
1. FEELING NERVOUS, ANXIOUS, OR ON EDGE: SEVERAL DAYS
5. BEING SO RESTLESS THAT IT IS HARD TO SIT STILL: NOT AT ALL
4. TROUBLE RELAXING: SEVERAL DAYS
7. FEELING AFRAID AS IF SOMETHING AWFUL MIGHT HAPPEN: MORE THAN HALF THE DAYS
1. FEELING NERVOUS, ANXIOUS, OR ON EDGE: SEVERAL DAYS
IF YOU CHECKED OFF ANY PROBLEMS ON THIS QUESTIONNAIRE, HOW DIFFICULT HAVE THESE PROBLEMS MADE IT FOR YOU TO DO YOUR WORK, TAKE CARE OF THINGS AT HOME, OR GET ALONG WITH OTHER PEOPLE: SOMEWHAT DIFFICULT
3. WORRYING TOO MUCH ABOUT DIFFERENT THINGS: MORE THAN HALF THE DAYS

## 2025-06-17 ASSESSMENT — PATIENT HEALTH QUESTIONNAIRE - PHQ9
4. FEELING TIRED OR HAVING LITTLE ENERGY: SEVERAL DAYS
SUM OF ALL RESPONSES TO PHQ QUESTIONS 1-9: 4
7. TROUBLE CONCENTRATING ON THINGS, SUCH AS READING THE NEWSPAPER OR WATCHING TELEVISION: NOT AT ALL
1. LITTLE INTEREST OR PLEASURE IN DOING THINGS: SEVERAL DAYS
SUM OF ALL RESPONSES TO PHQ QUESTIONS 1-9: 4
SUM OF ALL RESPONSES TO PHQ QUESTIONS 1-9: 4
1. LITTLE INTEREST OR PLEASURE IN DOING THINGS: SEVERAL DAYS
SUM OF ALL RESPONSES TO PHQ QUESTIONS 1-9: 4
6. FEELING BAD ABOUT YOURSELF - OR THAT YOU ARE A FAILURE OR HAVE LET YOURSELF OR YOUR FAMILY DOWN: SEVERAL DAYS
9. THOUGHTS THAT YOU WOULD BE BETTER OFF DEAD, OR OF HURTING YOURSELF: NOT AT ALL
2. FEELING DOWN, DEPRESSED OR HOPELESS: NOT AT ALL
7. TROUBLE CONCENTRATING ON THINGS, SUCH AS READING THE NEWSPAPER OR WATCHING TELEVISION: NOT AT ALL
8. MOVING OR SPEAKING SO SLOWLY THAT OTHER PEOPLE COULD HAVE NOTICED. OR THE OPPOSITE, BEING SO FIGETY OR RESTLESS THAT YOU HAVE BEEN MOVING AROUND A LOT MORE THAN USUAL: NOT AT ALL
3. TROUBLE FALLING OR STAYING ASLEEP: SEVERAL DAYS
5. POOR APPETITE OR OVEREATING: NOT AT ALL
5. POOR APPETITE OR OVEREATING: NOT AT ALL
8. MOVING OR SPEAKING SO SLOWLY THAT OTHER PEOPLE COULD HAVE NOTICED. OR THE OPPOSITE - BEING SO FIDGETY OR RESTLESS THAT YOU HAVE BEEN MOVING AROUND A LOT MORE THAN USUAL: NOT AT ALL
6. FEELING BAD ABOUT YOURSELF - OR THAT YOU ARE A FAILURE OR HAVE LET YOURSELF OR YOUR FAMILY DOWN: SEVERAL DAYS
10. IF YOU CHECKED OFF ANY PROBLEMS, HOW DIFFICULT HAVE THESE PROBLEMS MADE IT FOR YOU TO DO YOUR WORK, TAKE CARE OF THINGS AT HOME, OR GET ALONG WITH OTHER PEOPLE: SOMEWHAT DIFFICULT
9. THOUGHTS THAT YOU WOULD BE BETTER OFF DEAD, OR OF HURTING YOURSELF: NOT AT ALL
10. IF YOU CHECKED OFF ANY PROBLEMS, HOW DIFFICULT HAVE THESE PROBLEMS MADE IT FOR YOU TO DO YOUR WORK, TAKE CARE OF THINGS AT HOME, OR GET ALONG WITH OTHER PEOPLE: SOMEWHAT DIFFICULT
3. TROUBLE FALLING OR STAYING ASLEEP: SEVERAL DAYS
4. FEELING TIRED OR HAVING LITTLE ENERGY: SEVERAL DAYS
2. FEELING DOWN, DEPRESSED OR HOPELESS: NOT AT ALL
SUM OF ALL RESPONSES TO PHQ QUESTIONS 1-9: 4

## 2025-06-17 NOTE — PROGRESS NOTES
facility-administered medications for this visit.       Allergies   Allergen Reactions    Tree Nut [Macadamia Nut Oil] Anaphylaxis     \"Throat closes up\"     Buckwheat Itching    Cetirizine Swelling    Cottonseed Oil Itching       Past Medical History, Past Surgical History, Family history, Social History, and Medications were all reviewed with the patient today and updated as necessary. Where available I have reviewed outside charts in epic and I have referenced care everywhere where possible.     Compliant with medication: Yes   Side effects from medications:  No           No data to display                   EXAMINATION    Musculoskeletal    GAIT AND STATION   [] WNL   [] RESTRICTED   [] UNSTEADY WALK        [] ABNORMAL   [] UNBALANCED  [] WHEELCHAIR/  WALKER/CANE       MUSCLE STRENGTH AND TONE   [] WNL   [] ATROPHY   [] SPASTIC        [] FLACCID   [] COGWHEEL       ABNORMAL MOVEMENTS:   [x] NONE   [] TICS   [] TREMORS   [] BIZZARE      [] FACE   [] TRUNK   [] EXTREMETIES   [] GESTURES     PSYCHIATRIC    PSYCHOMOTOR   [x]  WNL   [] RETARDATION   [] AGITATION            GENERAL APPEARANCE:   [x]  WELL GROOMED []     DISHEVELED   []  UNKEMPT      []  UNUSUAL/BIZZARE    [] WNL       ATTITUDE:   [x] COOPERATIVE   [] GUARDED   [] SUSPICIOUS      [] HOSTILE                            BEHAVIOR:   [x] CALM   [] HYPERACTIVE   [] MANNERISMS      [] BIZZARE         SPEECH:   [x] NORMAL FOR CLIENT   [] SPONTANEOUS   [] SLURRED   [] WHISPERING      [] LOUD   [] PRESSURED   [] ARTICULATE        EYE CONTACT:   [x] WNL   [] BLANK STARE   [] INTENSE      [] AVOIDANT         MOOD:   [] EUTHYMIC   [x] ANXIOUS   [] DEPRESSED      [] IRRITABLE   [] ANGRY   [] APATHETIC     AFFECT:   [x] CONGRUENT WITH MOOD   [] FLAT   [] CONSTRICTED      [] INAPPROPRIATE   [] LABILE           THOUGHT PROCESS:   [x] LOGICAL/GOAL-DIRECTED   [] FOI   [] CIRCUMSANTIAL      [] INCOHERENT   [] TANGENTIAL   [] CONCRETE      [] PERSEVERATION        Toxicology

## 2025-06-30 ENCOUNTER — ANESTHESIA EVENT (OUTPATIENT)
Dept: ENDOSCOPY | Age: 55
End: 2025-06-30
Payer: COMMERCIAL

## 2025-07-01 ENCOUNTER — HOSPITAL ENCOUNTER (OUTPATIENT)
Age: 55
Setting detail: OUTPATIENT SURGERY
Discharge: HOME | End: 2025-07-01
Attending: INTERNAL MEDICINE | Admitting: INTERNAL MEDICINE
Payer: COMMERCIAL

## 2025-07-01 ENCOUNTER — ANESTHESIA (OUTPATIENT)
Dept: ENDOSCOPY | Age: 55
End: 2025-07-01
Payer: COMMERCIAL

## 2025-07-01 VITALS
RESPIRATION RATE: 22 BRPM | DIASTOLIC BLOOD PRESSURE: 87 MMHG | HEIGHT: 58 IN | HEART RATE: 80 BPM | OXYGEN SATURATION: 95 % | TEMPERATURE: 98.1 F | WEIGHT: 185.63 LBS | SYSTOLIC BLOOD PRESSURE: 121 MMHG | BODY MASS INDEX: 38.97 KG/M2

## 2025-07-01 DIAGNOSIS — K21.9 GASTROESOPHAGEAL REFLUX DISEASE, UNSPECIFIED WHETHER ESOPHAGITIS PRESENT: ICD-10-CM

## 2025-07-01 DIAGNOSIS — R13.19 ESOPHAGEAL DYSPHAGIA: ICD-10-CM

## 2025-07-01 PROCEDURE — C1769 GUIDE WIRE: HCPCS | Performed by: INTERNAL MEDICINE

## 2025-07-01 PROCEDURE — 88305 TISSUE EXAM BY PATHOLOGIST: CPT

## 2025-07-01 PROCEDURE — 43239 EGD BIOPSY SINGLE/MULTIPLE: CPT | Performed by: INTERNAL MEDICINE

## 2025-07-01 PROCEDURE — 2580000003 HC RX 258: Performed by: STUDENT IN AN ORGANIZED HEALTH CARE EDUCATION/TRAINING PROGRAM

## 2025-07-01 PROCEDURE — 43248 EGD GUIDE WIRE INSERTION: CPT | Performed by: INTERNAL MEDICINE

## 2025-07-01 PROCEDURE — 7100000011 HC PHASE II RECOVERY - ADDTL 15 MIN: Performed by: INTERNAL MEDICINE

## 2025-07-01 PROCEDURE — 2709999900 HC NON-CHARGEABLE SUPPLY: Performed by: INTERNAL MEDICINE

## 2025-07-01 PROCEDURE — 7100000010 HC PHASE II RECOVERY - FIRST 15 MIN: Performed by: INTERNAL MEDICINE

## 2025-07-01 PROCEDURE — 3700000000 HC ANESTHESIA ATTENDED CARE: Performed by: INTERNAL MEDICINE

## 2025-07-01 PROCEDURE — 3609017700 HC EGD DILATION GASTRIC/DUODENAL STRICTURE: Performed by: INTERNAL MEDICINE

## 2025-07-01 PROCEDURE — 3700000001 HC ADD 15 MINUTES (ANESTHESIA): Performed by: INTERNAL MEDICINE

## 2025-07-01 PROCEDURE — 6360000002 HC RX W HCPCS: Performed by: NURSE ANESTHETIST, CERTIFIED REGISTERED

## 2025-07-01 PROCEDURE — 2500000003 HC RX 250 WO HCPCS: Performed by: STUDENT IN AN ORGANIZED HEALTH CARE EDUCATION/TRAINING PROGRAM

## 2025-07-01 PROCEDURE — 6360000002 HC RX W HCPCS: Performed by: STUDENT IN AN ORGANIZED HEALTH CARE EDUCATION/TRAINING PROGRAM

## 2025-07-01 RX ORDER — PROPOFOL 10 MG/ML
INJECTION, EMULSION INTRAVENOUS
Status: DISCONTINUED | OUTPATIENT
Start: 2025-07-01 | End: 2025-07-01 | Stop reason: SDUPTHER

## 2025-07-01 RX ORDER — LIDOCAINE HYDROCHLORIDE 20 MG/ML
INJECTION, SOLUTION EPIDURAL; INFILTRATION; INTRACAUDAL; PERINEURAL
Status: DISCONTINUED | OUTPATIENT
Start: 2025-07-01 | End: 2025-07-01 | Stop reason: SDUPTHER

## 2025-07-01 RX ORDER — NALOXONE HYDROCHLORIDE 0.4 MG/ML
INJECTION, SOLUTION INTRAMUSCULAR; INTRAVENOUS; SUBCUTANEOUS PRN
Status: DISCONTINUED | OUTPATIENT
Start: 2025-07-01 | End: 2025-07-01 | Stop reason: HOSPADM

## 2025-07-01 RX ORDER — SODIUM CHLORIDE 0.9 % (FLUSH) 0.9 %
5-40 SYRINGE (ML) INJECTION PRN
Status: DISCONTINUED | OUTPATIENT
Start: 2025-07-01 | End: 2025-07-01 | Stop reason: HOSPADM

## 2025-07-01 RX ORDER — SODIUM CHLORIDE 0.9 % (FLUSH) 0.9 %
5-40 SYRINGE (ML) INJECTION EVERY 12 HOURS SCHEDULED
Status: DISCONTINUED | OUTPATIENT
Start: 2025-07-01 | End: 2025-07-01 | Stop reason: HOSPADM

## 2025-07-01 RX ORDER — SODIUM CHLORIDE 9 MG/ML
INJECTION, SOLUTION INTRAVENOUS PRN
Status: DISCONTINUED | OUTPATIENT
Start: 2025-07-01 | End: 2025-07-01 | Stop reason: HOSPADM

## 2025-07-01 RX ORDER — SODIUM CHLORIDE 9 MG/ML
25 INJECTION, SOLUTION INTRAVENOUS PRN
Status: DISCONTINUED | OUTPATIENT
Start: 2025-07-01 | End: 2025-07-01 | Stop reason: HOSPADM

## 2025-07-01 RX ORDER — LIDOCAINE HYDROCHLORIDE 10 MG/ML
1 INJECTION, SOLUTION INFILTRATION; PERINEURAL
Status: DISCONTINUED | OUTPATIENT
Start: 2025-07-01 | End: 2025-07-01 | Stop reason: HOSPADM

## 2025-07-01 RX ORDER — SODIUM CHLORIDE, SODIUM LACTATE, POTASSIUM CHLORIDE, CALCIUM CHLORIDE 600; 310; 30; 20 MG/100ML; MG/100ML; MG/100ML; MG/100ML
INJECTION, SOLUTION INTRAVENOUS CONTINUOUS
Status: DISCONTINUED | OUTPATIENT
Start: 2025-07-01 | End: 2025-07-01 | Stop reason: HOSPADM

## 2025-07-01 RX ADMIN — LIDOCAINE HYDROCHLORIDE 100 MG: 20 INJECTION, SOLUTION EPIDURAL; INFILTRATION; INTRACAUDAL; PERINEURAL at 11:16

## 2025-07-01 RX ADMIN — PROPOFOL 100 MG: 10 INJECTION, EMULSION INTRAVENOUS at 11:17

## 2025-07-01 RX ADMIN — SODIUM CHLORIDE, SODIUM LACTATE, POTASSIUM CHLORIDE, AND CALCIUM CHLORIDE: .6; .31; .03; .02 INJECTION, SOLUTION INTRAVENOUS at 10:36

## 2025-07-01 RX ADMIN — FAMOTIDINE 20 MG: 10 INJECTION, SOLUTION INTRAVENOUS at 11:01

## 2025-07-01 RX ADMIN — PROPOFOL 100 MG: 10 INJECTION, EMULSION INTRAVENOUS at 11:18

## 2025-07-01 RX ADMIN — PROPOFOL 100 MG: 10 INJECTION, EMULSION INTRAVENOUS at 11:19

## 2025-07-01 RX ADMIN — PROPOFOL 100 MG: 10 INJECTION, EMULSION INTRAVENOUS at 11:16

## 2025-07-01 ASSESSMENT — PAIN - FUNCTIONAL ASSESSMENT: PAIN_FUNCTIONAL_ASSESSMENT: 0-10

## 2025-07-01 NOTE — ANESTHESIA PRE PROCEDURE
Department of Anesthesiology  Preprocedure Note       Name:  Mera Valles   Age:  54 y.o.  :  1970                                          MRN:  [id number]         Date:  2025      Surgeon: Surgeon(s):  Safia Lemos MD    Procedure: Procedure(s):  ESOPHAGOGASTRODUODENOSCOPY WITH DILATION    Medications prior to admission:   Prior to Admission medications    Medication Sig Start Date End Date Taking? Authorizing Provider   ALPRAZolam (XANAX) 0.25 MG tablet Take 1 tablet by mouth daily as needed for Sleep for up to 90 days. Max Daily Amount: 0.25 mg 6/17/25 9/15/25 Yes Jackeline Reveles MD   Magnesium Bisglycinate (MAG GLYCINATE PO) Take by mouth   Yes ProviderBradley MD   omeprazole (PRILOSEC) 20 MG delayed release capsule Take 1 capsule by mouth 2 times daily (before meals) 25  Yes Grinnell, Melissa R, PA-C   metroNIDAZOLE (METROCREAM) 0.75 % cream Apply topically 2 times daily 10/13/24  Yes ProviderBradley MD   atorvastatin (LIPITOR) 10 MG tablet Take 1 tablet by mouth daily  Patient taking differently: Take 1 tablet by mouth every evening 10/11/24  Yes Dio Ordaz MD   Nutritional Supplements (ESTROVEN PO) Take by mouth SOY FREE   Yes ProviderBradley MD   estradiol (ESTRACE VAGINAL) 0.1 MG/GM vaginal cream Insert 1/4 applicator (0.5 g)  vaginally qhs x 2 weeks. Apply small pea-sized amount to vulva. After initial 2 weeks, insert and apply cream 2 times a week 24  Yes Snow Mays MD   Calcium Carb-Cholecalciferol (CALCIUM 1000 + D PO) Take by mouth   Yes ProviderBradley MD   montelukast (SINGULAIR) 10 MG tablet Take 1 tablet by mouth nightly 23  Yes Silvestre Sadler MD   Multiple Vitamins-Minerals (MULTIVITAMIN WITH MINERALS) tablet Take 1 tablet by mouth daily 23  Yes Silvestre Sadler MD       Current medications:    Current Facility-Administered Medications   Medication Dose Route Frequency Provider Last Rate Last Admin

## 2025-07-01 NOTE — ANESTHESIA POSTPROCEDURE EVALUATION
Department of Anesthesiology  Postprocedure Note    Patient: Mera Valles  MRN: [id number]  YOB: 1970  Date of evaluation: 7/1/2025    Procedure Summary       Date: 07/01/25 Room / Location: [facility] ENDO 01 / [facility] ENDOSCOPY    Anesthesia Start: 1111 Anesthesia Stop: 1135    Procedure: ESOPHAGOGASTRODUODENOSCOPY WITH DILATION/BIOPSY (Upper GI Region) Diagnosis:       Esophageal dysphagia      Gastroesophageal reflux disease, unspecified whether esophagitis present      (Esophageal dysphagia [R13.19])      (Gastroesophageal reflux disease, unspecified whether esophagitis present [K21.9])    Surgeons: Safia Lemos MD Responsible Provider: Sourav Lott MD    Anesthesia Type: TIVA ASA Status: 2            Anesthesia Type: TIVA    Tommy Phase I:      Tommy Phase II: Tommy Score: 10    Anesthesia Post Evaluation    Patient location during evaluation: PACU  Patient participation: complete - patient participated  Level of consciousness: awake  Airway patency: patent  Nausea & Vomiting: no nausea and no vomiting  Cardiovascular status: blood pressure returned to baseline  Respiratory status: acceptable  Hydration status: euvolemic  Pain management: adequate    No notable events documented.

## 2025-07-01 NOTE — DISCHARGE INSTRUCTIONS
Upper GI Endoscopy: What to Expect at Home  Your Recovery  After you have an endoscopy you will be able to go home after your doctor or nurse checks to make sure you are not having any problems.  You may have to stay overnight if you had treatment during the test. You may have a sore throat for a day or two after the test.  This care sheet gives you a general idea about what to expect after the test.  How can you care for yourself at home?  Activity  Rest as much as you need to after you go home.  You should be able to go back to your usual activities the day after the test.  Diet  Follow your doctor's directions for eating after the test.  Drink plenty of fluids (unless your doctor has told you not to).  Medications  If you have a sore throat the day after the test, use an over-the-counter spray to numb your throat.    Medication Interaction:  During your procedure you potentially received a medication or medications which may reduce the effectiveness of oral contraceptives. Please consider other forms of contraception for 1 month following your procedure if you are currently using oral contraceptives as your primary form of birth control. In addition to this, we recommend continuing your oral contraceptive as prescribed, unless otherwise instructed by your physician, during this time.    Follow-up care is a key part of your treatment and safety. Be sure to make and go to all appointments, and call your doctor if you are having problems. It's also a good idea to know your test results and keep a list of the medicines you take.    When should you call for help?  Call 911 anytime you think you may need emergency care. For example, call if:  You passed out (lost consciousness).  You cough up blood.  You vomit blood or what looks like coffee grounds.  You pass maroon or very bloody stools.  Call your doctor now or seek immediate medical care if:  You have trouble swallowing.  You have belly pain.  Your stools are black

## 2025-07-01 NOTE — H&P
GASTROENTEROLOGY H&P    Mera Valles is 54 y.o. y/o female here for dysphagia and GERD       Past Medical History:   Diagnosis Date    Anxiety     B12 deficiency 3/9/2015    Diverticulitis 26    Esophagitis determined by endoscopy 2016    GERD (gastroesophageal reflux disease) 2015    managed with medication    Hyperlipidemia LDL goal < 100 3/9/2015    Hypertension     borderline-- per pt never been on meds    Obesity     Palpitations 2015    PAC's    Passive smoker 2015    Psychiatric disorder     anxiety, panic    Vitamin D deficiency 3/9/2015     Past Surgical History:   Procedure Laterality Date     SECTION          CHOLECYSTECTOMY      COLONOSCOPY N/A 2024    COLORECTAL CANCER SCREENING, NOT HIGH RISK performed by Safia Lemos MD at [facility] ENDOSCOPY    TUBAL LIGATION       Family History   Problem Relation Age of Onset    Stroke Mother     Elevated Lipids Mother     Colon Polyps Mother     Hypertension Father     Anxiety Disorder Father     Cancer Maternal Aunt         cervical    Breast Cancer Neg Hx      Social History     Tobacco Use    Smoking status: Never    Smokeless tobacco: Never   Vaping Use    Vaping status: Never Used   Substance Use Topics    Alcohol use: Yes     Alcohol/week: 3.0 standard drinks of alcohol     Types: 3 Glasses of wine per week     Comment: occ    Drug use: No         PE:    General:  The patient appears well-nourished, and is in no acute distress.    HEENT:  Normocephalic, atraumatic. No sclerae icterus.   Respiratory: Respiratory effort is normal.     Cardiovascular:  Regular rate and rhythm.     Abdomen:  Soft, non tender to palpation. No distention.     Assessment and Plan:   1. Esophageal dysphagia  2. Gastroesophageal reflux disease, unspecified whether esophagitis present    Proceed with EGD. Further recommendations to follow procedure.       Safia Lemos MD  [facility]

## 2025-07-31 ENCOUNTER — HOSPITAL ENCOUNTER (OUTPATIENT)
Dept: MAMMOGRAPHY | Age: 55
Discharge: HOME | End: 2025-07-31
Payer: COMMERCIAL

## 2025-07-31 DIAGNOSIS — Z12.31 ENCOUNTER FOR SCREENING MAMMOGRAM FOR BREAST CANCER: ICD-10-CM

## 2025-07-31 PROCEDURE — 77063 BREAST TOMOSYNTHESIS BI: CPT

## 2025-09-04 ENCOUNTER — OFFICE VISIT (OUTPATIENT)
Dept: PRIMARY CARE CLINIC | Facility: CLINIC | Age: 55
End: 2025-09-04
Payer: COMMERCIAL

## 2025-09-04 VITALS
SYSTOLIC BLOOD PRESSURE: 120 MMHG | HEIGHT: 59 IN | BODY MASS INDEX: 37.09 KG/M2 | DIASTOLIC BLOOD PRESSURE: 80 MMHG | WEIGHT: 184 LBS

## 2025-09-04 DIAGNOSIS — E55.9 VITAMIN D DEFICIENCY: ICD-10-CM

## 2025-09-04 DIAGNOSIS — K21.9 GASTROESOPHAGEAL REFLUX DISEASE WITHOUT ESOPHAGITIS: ICD-10-CM

## 2025-09-04 DIAGNOSIS — F41.8 ANXIETY WITH DEPRESSION: ICD-10-CM

## 2025-09-04 DIAGNOSIS — E78.2 MIXED HYPERLIPIDEMIA: ICD-10-CM

## 2025-09-04 DIAGNOSIS — R73.9 ELEVATED BLOOD SUGAR: ICD-10-CM

## 2025-09-04 DIAGNOSIS — R63.5 WEIGHT GAIN: Primary | ICD-10-CM

## 2025-09-04 PROCEDURE — 99214 OFFICE O/P EST MOD 30 MIN: CPT | Performed by: INTERNAL MEDICINE

## 2025-09-04 RX ORDER — OMEPRAZOLE/SODIUM BICARBONATE 20; 1680 MG/1; MG/1
POWDER, FOR SUSPENSION ORAL
COMMUNITY

## (undated) DEVICE — SYRINGE MED 10ML LUERLOCK TIP W/O SFTY DISP

## (undated) DEVICE — ENDOSCOPIC KIT 1.1+ OP4 CA DE 2 GWN AAMI LEVEL 3

## (undated) DEVICE — NEEDLE SYRINGE 18GA L1.5IN RED PLAS HUB S STL BLNT FILL W/O

## (undated) DEVICE — LUBE JELLY FOIL PACK 1.4 OZ: Brand: MEDLINE INDUSTRIES, INC.

## (undated) DEVICE — GAUZE,SPONGE,4"X4",12PLY,WOVEN,NS,LF: Brand: MEDLINE

## (undated) DEVICE — CONNECTOR TBNG OD5-7MM O2 END DISP

## (undated) DEVICE — SINGLE PORT MANIFOLD: Brand: NEPTUNE 2

## (undated) DEVICE — CONTAINER FORMALIN PREFILLED 10% NBF 60ML

## (undated) DEVICE — FORCEPS BX L240CM JAW DIA2.8MM L CAP W/ NDL MIC MESH TOOTH

## (undated) DEVICE — BLOCK BITE AD 60FR W/ VELC STRP ADDRESSES MOST PT AND

## (undated) DEVICE — KENDALL RADIOLUCENT FOAM MONITORING ELECTRODE RECTANGULAR SHAPE: Brand: KENDALL

## (undated) DEVICE — SYRINGE MEDICAL 3ML CLEAR PLASTIC STANDARD NON CONTROL LUERLOCK TIP DISPOSABLE

## (undated) DEVICE — AIRLIFE™ OXYGEN TUBING 7 FEET (2.1 M) CRUSH RESISTANT OXYGEN TUBING, VINYL TIPPED: Brand: AIRLIFE™

## (undated) DEVICE — MOUTHPIECE ENDOSCP L CTRL OPN AND SIDE PORTS DISP

## (undated) DEVICE — SOLUTION IRRIG 1000ML H2O PIC PLAS SHATTERPROOF CONTAINER

## (undated) DEVICE — GUIDEWIRE WITH SPRING TIP - SINGLE USE: Brand: SAFEGUIDE®

## (undated) DEVICE — BALLOON US E LIN RNG O KT FOR FG-32UA